# Patient Record
Sex: FEMALE | Race: WHITE | Employment: FULL TIME | URBAN - METROPOLITAN AREA
[De-identification: names, ages, dates, MRNs, and addresses within clinical notes are randomized per-mention and may not be internally consistent; named-entity substitution may affect disease eponyms.]

---

## 2017-07-19 ENCOUNTER — OFFICE VISIT (OUTPATIENT)
Dept: INTERNAL MEDICINE CLINIC | Facility: CLINIC | Age: 47
End: 2017-07-19

## 2017-07-19 VITALS
SYSTOLIC BLOOD PRESSURE: 102 MMHG | WEIGHT: 158 LBS | BODY MASS INDEX: 23.95 KG/M2 | OXYGEN SATURATION: 98 % | HEIGHT: 68 IN | DIASTOLIC BLOOD PRESSURE: 60 MMHG | TEMPERATURE: 98 F | HEART RATE: 72 BPM

## 2017-07-19 DIAGNOSIS — M79.644 BILATERAL THUMB PAIN: Primary | ICD-10-CM

## 2017-07-19 DIAGNOSIS — R10.12 LUQ ABDOMINAL PAIN: ICD-10-CM

## 2017-07-19 DIAGNOSIS — M79.645 BILATERAL THUMB PAIN: Primary | ICD-10-CM

## 2017-07-19 PROCEDURE — 99214 OFFICE O/P EST MOD 30 MIN: CPT | Performed by: INTERNAL MEDICINE

## 2017-07-19 PROCEDURE — 99212 OFFICE O/P EST SF 10 MIN: CPT | Performed by: INTERNAL MEDICINE

## 2017-07-19 RX ORDER — PANTOPRAZOLE SODIUM 40 MG/1
40 TABLET, DELAYED RELEASE ORAL
Qty: 90 TABLET | Refills: 3 | Status: SHIPPED | OUTPATIENT
Start: 2017-07-19 | End: 2018-12-12

## 2017-07-19 RX ORDER — OMEGA-3 FATTY ACIDS/FISH OIL 300-1000MG
1 CAPSULE ORAL
COMMUNITY
End: 2017-07-19

## 2017-07-19 NOTE — PROGRESS NOTES
Illene Carrel is a 52year old female who presents for     Thumbs pain:  Thumbs hurt for a few mo. No fall or injury. Has became painful to open jar or water bottle. Feels worse when texts twice a day. Works on computer all day. Tried advil.  Helps stomach    Review of systems:  Constitutional:  No fever, loss of appetite or unintentional weight loss  Gastrointestinal: No vomiting, diarrhea or constipation  : no dysuria or blood in urine. No reg menses on Mirena IUD. Spots every 6 mo.  Will see  Rfl: 0         Ray Brooks MD  7/19/2017

## 2017-07-21 ENCOUNTER — HOSPITAL ENCOUNTER (OUTPATIENT)
Dept: GENERAL RADIOLOGY | Facility: HOSPITAL | Age: 47
Discharge: HOME | End: 2017-07-21
Attending: INTERNAL MEDICINE
Payer: COMMERCIAL

## 2017-07-21 ENCOUNTER — LAB ENCOUNTER (OUTPATIENT)
Dept: LAB | Facility: HOSPITAL | Age: 47
End: 2017-07-21
Attending: INTERNAL MEDICINE
Payer: COMMERCIAL

## 2017-07-21 DIAGNOSIS — M79.644 BILATERAL THUMB PAIN: ICD-10-CM

## 2017-07-21 DIAGNOSIS — M79.645 BILATERAL THUMB PAIN: ICD-10-CM

## 2017-07-21 DIAGNOSIS — R10.12 LUQ ABDOMINAL PAIN: ICD-10-CM

## 2017-07-21 LAB
ALBUMIN SERPL BCP-MCNC: 4 G/DL (ref 3.5–4.8)
ALBUMIN/GLOB SERPL: 1.3 {RATIO} (ref 1–2)
ALP SERPL-CCNC: 49 U/L (ref 32–100)
ALT SERPL-CCNC: 15 U/L (ref 14–54)
ANION GAP SERPL CALC-SCNC: 5 MMOL/L (ref 0–18)
AST SERPL-CCNC: 14 U/L (ref 15–41)
BASOPHILS # BLD: 0 K/UL (ref 0–0.2)
BASOPHILS NFR BLD: 1 %
BILIRUB SERPL-MCNC: 1 MG/DL (ref 0.3–1.2)
BILIRUB UR QL: NEGATIVE
BUN SERPL-MCNC: 19 MG/DL (ref 8–20)
BUN/CREAT SERPL: 23.8 (ref 10–20)
CALCIUM SERPL-MCNC: 9.1 MG/DL (ref 8.5–10.5)
CHLORIDE SERPL-SCNC: 109 MMOL/L (ref 95–110)
CHOLEST SERPL-MCNC: 133 MG/DL (ref 110–200)
CO2 SERPL-SCNC: 27 MMOL/L (ref 22–32)
COLOR UR: YELLOW
CREAT SERPL-MCNC: 0.8 MG/DL (ref 0.5–1.5)
EOSINOPHIL # BLD: 0.1 K/UL (ref 0–0.7)
EOSINOPHIL NFR BLD: 2 %
ERYTHROCYTE [DISTWIDTH] IN BLOOD BY AUTOMATED COUNT: 14.3 % (ref 11–15)
GLOBULIN PLAS-MCNC: 3.1 G/DL (ref 2.5–3.7)
GLUCOSE SERPL-MCNC: 105 MG/DL (ref 70–99)
GLUCOSE UR-MCNC: NEGATIVE MG/DL
HCT VFR BLD AUTO: 40.3 % (ref 35–48)
HDLC SERPL-MCNC: 58 MG/DL
HGB BLD-MCNC: 13.6 G/DL (ref 12–16)
KETONES UR-MCNC: NEGATIVE MG/DL
LDLC SERPL CALC-MCNC: 61 MG/DL (ref 0–99)
LYMPHOCYTES # BLD: 2 K/UL (ref 1–4)
LYMPHOCYTES NFR BLD: 30 %
MCH RBC QN AUTO: 30.5 PG (ref 27–32)
MCHC RBC AUTO-ENTMCNC: 33.9 G/DL (ref 32–37)
MCV RBC AUTO: 90 FL (ref 80–100)
MONOCYTES # BLD: 0.5 K/UL (ref 0–1)
MONOCYTES NFR BLD: 8 %
NEUTROPHILS # BLD AUTO: 3.9 K/UL (ref 1.8–7.7)
NEUTROPHILS NFR BLD: 59 %
NITRITE UR QL STRIP.AUTO: NEGATIVE
NONHDLC SERPL-MCNC: 75 MG/DL
OSMOLALITY UR CALC.SUM OF ELEC: 295 MOSM/KG (ref 275–295)
PH UR: 5 [PH] (ref 5–8)
PLATELET # BLD AUTO: 208 K/UL (ref 140–400)
PMV BLD AUTO: 7.5 FL (ref 7.4–10.3)
POTASSIUM SERPL-SCNC: 4.6 MMOL/L (ref 3.3–5.1)
PROT SERPL-MCNC: 7.1 G/DL (ref 5.9–8.4)
PROT UR-MCNC: NEGATIVE MG/DL
RBC # BLD AUTO: 4.47 M/UL (ref 3.7–5.4)
RBC #/AREA URNS AUTO: 0 /HPF
SODIUM SERPL-SCNC: 141 MMOL/L (ref 136–144)
SP GR UR STRIP: 1.02 (ref 1–1.03)
TRIGL SERPL-MCNC: 71 MG/DL (ref 1–149)
TSH SERPL-ACNC: 3.98 UIU/ML (ref 0.45–5.33)
UROBILINOGEN UR STRIP-ACNC: <2
VIT C UR-MCNC: NEGATIVE MG/DL
WBC # BLD AUTO: 6.5 K/UL (ref 4–11)
WBC #/AREA URNS AUTO: 3 /HPF

## 2017-07-21 PROCEDURE — 73140 X-RAY EXAM OF FINGER(S): CPT | Performed by: INTERNAL MEDICINE

## 2017-07-21 PROCEDURE — 80053 COMPREHEN METABOLIC PANEL: CPT

## 2017-07-21 PROCEDURE — 84443 ASSAY THYROID STIM HORMONE: CPT

## 2017-07-21 PROCEDURE — 81001 URINALYSIS AUTO W/SCOPE: CPT | Performed by: INTERNAL MEDICINE

## 2017-07-21 PROCEDURE — 85025 COMPLETE CBC W/AUTO DIFF WBC: CPT

## 2017-07-21 PROCEDURE — 36415 COLL VENOUS BLD VENIPUNCTURE: CPT

## 2017-07-21 PROCEDURE — 80061 LIPID PANEL: CPT

## 2017-07-23 ENCOUNTER — TELEPHONE (OUTPATIENT)
Dept: INTERNAL MEDICINE CLINIC | Facility: CLINIC | Age: 47
End: 2017-07-23

## 2017-07-23 NOTE — TELEPHONE ENCOUNTER
To nursing. Please tell pt lab done on 7/21/17-cbc cmp tsh lipid ua-results are ok. Xray of both thumbs-results are negative. No arthritis seen. Therefore her thumb pain is likely in the soft tissues such as a tendonitis.    My recommendation would be t

## 2017-07-24 NOTE — TELEPHONE ENCOUNTER
Called patient and relayed DR. CATALAN message - verbalized understanding. Number for DR. Villarreal given . Patient still has some pain but is only on pantoprazole for a couple days .  If pain gets worse she will schedule roberto sooner then 8/11

## 2017-08-03 ENCOUNTER — OFFICE VISIT (OUTPATIENT)
Dept: SURGERY | Facility: CLINIC | Age: 47
End: 2017-08-03

## 2017-08-03 DIAGNOSIS — M79.641 PAIN IN RIGHT HAND: Primary | ICD-10-CM

## 2017-08-03 DIAGNOSIS — M65.841 OTHER SYNOVITIS AND TENOSYNOVITIS, RIGHT HAND: ICD-10-CM

## 2017-08-03 DIAGNOSIS — M79.642 PAIN OF LEFT HAND: ICD-10-CM

## 2017-08-03 DIAGNOSIS — M65.842 OTHER SYNOVITIS AND TENOSYNOVITIS, LEFT HAND: ICD-10-CM

## 2017-08-03 PROCEDURE — 99243 OFF/OP CNSLTJ NEW/EST LOW 30: CPT | Performed by: PLASTIC SURGERY

## 2017-08-03 PROCEDURE — 99212 OFFICE O/P EST SF 10 MIN: CPT | Performed by: PLASTIC SURGERY

## 2017-08-03 RX ORDER — INDOMETHACIN 25 MG/1
25 CAPSULE ORAL
Qty: 63 CAPSULE | Refills: 0 | Status: SHIPPED | OUTPATIENT
Start: 2017-08-03 | End: 2017-08-11

## 2017-08-03 NOTE — H&P
France Mccabe is a 52year old female that presents with Patient presents with:  Pain: bilateral thumb  .     REFERRED BY:  Beryle Primas      Pacemaker: No  Latex Allergy: no  Coumadin: No  Plavix: No  Other anticoagulants: No  Cardiac stents: No stomach    Past Medical History:   Diagnosis Date   • Anxiety    • Asthma    • Cyst near tailbone     removed   • Meningitis due to viruses 2001   • Microscopic hematuria 2015    CT urogram 12/24/15 negative on conclusion (8 mm cyst R kidney) to follow-up Appropriate mood and affect    Hand Physical Exam:      ROM: bilateral full painless   Wrist Hyperextension: bilateral excellent   Thenar Intrinsics: bilateral intact/symmetric   Ulnar Intrinsics: bilateral intact/symmetric          THUMB CMC:  bilateral

## 2017-08-11 ENCOUNTER — OFFICE VISIT (OUTPATIENT)
Dept: INTERNAL MEDICINE CLINIC | Facility: CLINIC | Age: 47
End: 2017-08-11

## 2017-08-11 VITALS
TEMPERATURE: 99 F | DIASTOLIC BLOOD PRESSURE: 80 MMHG | SYSTOLIC BLOOD PRESSURE: 114 MMHG | WEIGHT: 161.81 LBS | BODY MASS INDEX: 24.52 KG/M2 | OXYGEN SATURATION: 98 % | HEART RATE: 71 BPM | RESPIRATION RATE: 18 BRPM | HEIGHT: 68 IN

## 2017-08-11 DIAGNOSIS — M94.0 COSTOCHONDRITIS: ICD-10-CM

## 2017-08-11 DIAGNOSIS — M65.9 TENOSYNOVITIS OF THUMB: Primary | ICD-10-CM

## 2017-08-11 PROCEDURE — 99212 OFFICE O/P EST SF 10 MIN: CPT | Performed by: INTERNAL MEDICINE

## 2017-08-11 PROCEDURE — 99213 OFFICE O/P EST LOW 20 MIN: CPT | Performed by: INTERNAL MEDICINE

## 2017-08-11 RX ORDER — MELOXICAM 7.5 MG/1
7.5 TABLET ORAL
Qty: 30 TABLET | Refills: 1 | Status: SHIPPED | OUTPATIENT
Start: 2017-08-11 | End: 2017-12-28

## 2017-08-11 NOTE — PROGRESS NOTES
gAustina Sweeney is a 52year old female who presents for     3 wk check    Thumbs pain f/u:  Is better-not as intense as it was. Not as painful to open jars. R thumb pan is gone.  L thumb is still a little painful at mcp and radial wrist.   Saw Dr Kashif Hammond Location: Right arm, Patient Position: Sitting, Cuff Size: adult)   Pulse 71   Temp 98.9 °F (37.2 °C) (Oral)   Resp 18   Ht 5' 8\" (1.727 m)   Wt 161 lb 12.8 oz (73.4 kg)   SpO2 98%   BMI 24.60 kg/m²       Wt Readings from Last 6 Encounters:  08/11/17 : 16 Intrauterine route once.  Disp: 1 each Rfl: 0         Maddie Diaz MD  8/11/2017

## 2017-08-24 ENCOUNTER — OFFICE VISIT (OUTPATIENT)
Dept: SURGERY | Facility: CLINIC | Age: 47
End: 2017-08-24

## 2017-08-24 DIAGNOSIS — M65.842 OTHER SYNOVITIS AND TENOSYNOVITIS, LEFT HAND: Primary | ICD-10-CM

## 2017-08-24 DIAGNOSIS — M65.841 OTHER SYNOVITIS AND TENOSYNOVITIS, RIGHT HAND: ICD-10-CM

## 2017-08-24 PROCEDURE — 99212 OFFICE O/P EST SF 10 MIN: CPT | Performed by: PLASTIC SURGERY

## 2017-08-24 PROCEDURE — 99213 OFFICE O/P EST LOW 20 MIN: CPT | Performed by: PLASTIC SURGERY

## 2017-08-24 NOTE — PROGRESS NOTES
Pt was seen here 3 weeks ago. Wearing Neoprene splints manpreet 8-10 hrs/day and all night  Started taking Indocin. After 1.5 weeks felt she was drowsy, and eating constantly; gaining wt.   Dr. Lalo Chun changed Indocin to Meloxicam.  Pt states stopped taking

## 2017-09-22 ENCOUNTER — OFFICE VISIT (OUTPATIENT)
Dept: INTERNAL MEDICINE CLINIC | Facility: CLINIC | Age: 47
End: 2017-09-22

## 2017-09-22 VITALS
SYSTOLIC BLOOD PRESSURE: 112 MMHG | BODY MASS INDEX: 24.4 KG/M2 | HEART RATE: 60 BPM | WEIGHT: 161 LBS | TEMPERATURE: 99 F | HEIGHT: 68 IN | DIASTOLIC BLOOD PRESSURE: 70 MMHG

## 2017-09-22 DIAGNOSIS — J06.9 URI, ACUTE: Primary | ICD-10-CM

## 2017-09-22 DIAGNOSIS — M94.0 COSTOCHONDRITIS: ICD-10-CM

## 2017-09-22 PROCEDURE — 99213 OFFICE O/P EST LOW 20 MIN: CPT | Performed by: INTERNAL MEDICINE

## 2017-09-22 PROCEDURE — 99212 OFFICE O/P EST SF 10 MIN: CPT | Performed by: INTERNAL MEDICINE

## 2017-09-22 RX ORDER — ESTROGEN,CON/M-PROGEST ACET 0.3-1.5MG
TABLET ORAL
Refills: 1 | COMMUNITY
Start: 2017-09-12 | End: 2018-12-12 | Stop reason: ALTCHOICE

## 2017-09-22 RX ORDER — AZITHROMYCIN 250 MG/1
TABLET, FILM COATED ORAL
Qty: 1 PACKAGE | Refills: 0 | Status: SHIPPED | OUTPATIENT
Start: 2017-09-22 | End: 2017-12-28

## 2017-09-22 NOTE — PROGRESS NOTES
Elsa Hankins is a 52year old female who presents for     5 wk check     Thumbs pain is much better. Taking meloxicam 7.5 mg prn.     LUQ pain-L rib margin pain resolved. Didn't do the CXR.     Saw Dr Lety Acuna and he started prempro for menopausal s 24.48 kg/m²       Wt Readings from Last 6 Encounters:  09/22/17 : 161 lb (73 kg)  08/11/17 : 161 lb 12.8 oz (73.4 kg)  07/19/17 : 158 lb (71.7 kg)  07/19/16 : 160 lb 12.8 oz (72.9 kg)  06/21/16 : 154 lb (69.9 kg)  03/25/16 : 164 lb (74.4 kg)      General: mouth every morning before breakfast. Disp: 90 tablet Rfl: 3   LORazepam 0.5 MG Oral Tab Take 0.5 mg by mouth 2 (two) times daily as needed for Anxiety. Disp:  Rfl:    Levonorgestrel (MIRENA) 20 MCG/24HR Intrauterine IUD 20 mcg by Intrauterine route once.

## 2017-11-04 ENCOUNTER — HOSPITAL ENCOUNTER (OUTPATIENT)
Dept: MAMMOGRAPHY | Facility: HOSPITAL | Age: 47
Discharge: HOME | End: 2017-11-04
Attending: OBSTETRICS & GYNECOLOGY
Payer: COMMERCIAL

## 2017-11-04 DIAGNOSIS — Z12.31 VISIT FOR SCREENING MAMMOGRAM: ICD-10-CM

## 2017-11-04 PROCEDURE — 77067 SCR MAMMO BI INCL CAD: CPT | Performed by: OBSTETRICS & GYNECOLOGY

## 2017-12-28 ENCOUNTER — OFFICE VISIT (OUTPATIENT)
Dept: INTERNAL MEDICINE CLINIC | Facility: CLINIC | Age: 47
End: 2017-12-28

## 2017-12-28 ENCOUNTER — TELEPHONE (OUTPATIENT)
Dept: INTERNAL MEDICINE CLINIC | Facility: CLINIC | Age: 47
End: 2017-12-28

## 2017-12-28 VITALS
TEMPERATURE: 98 F | HEIGHT: 67 IN | BODY MASS INDEX: 27.15 KG/M2 | HEART RATE: 95 BPM | SYSTOLIC BLOOD PRESSURE: 110 MMHG | DIASTOLIC BLOOD PRESSURE: 78 MMHG | OXYGEN SATURATION: 98 % | WEIGHT: 173 LBS

## 2017-12-28 DIAGNOSIS — B34.9 VIRAL SYNDROME: Primary | ICD-10-CM

## 2017-12-28 DIAGNOSIS — J02.9 SORE THROAT: ICD-10-CM

## 2017-12-28 DIAGNOSIS — R53.83 FATIGUE, UNSPECIFIED TYPE: ICD-10-CM

## 2017-12-28 PROCEDURE — 99213 OFFICE O/P EST LOW 20 MIN: CPT | Performed by: INTERNAL MEDICINE

## 2017-12-28 PROCEDURE — 99212 OFFICE O/P EST SF 10 MIN: CPT | Performed by: INTERNAL MEDICINE

## 2017-12-28 NOTE — PROGRESS NOTES
Thomas Kiser is a 52year old female. Patient presents with:   Follow - Up: Complaints of low grade fever, fatigue, vomitting, coughing, nasal congestion, headache - since last night       HPI:   Thomas Kiser is a 52year old female who presents fo removed (L) 1995-96  No date: OTHER SURGICAL HISTORY      Comment: cyst removed from lower back   Family History   Problem Relation Age of Onset   • Heart Disorder Father    • Lipids Father    • Hypertension Father    • Heart Attack Father 50   • Heart Dis

## 2017-12-30 ENCOUNTER — TELEPHONE (OUTPATIENT)
Dept: INTERNAL MEDICINE CLINIC | Facility: CLINIC | Age: 47
End: 2017-12-30

## 2017-12-30 RX ORDER — AZITHROMYCIN 250 MG/1
TABLET, FILM COATED ORAL
Qty: 6 TABLET | Refills: 0 | COMMUNITY
Start: 2017-12-30 | End: 2018-07-13 | Stop reason: ALTCHOICE

## 2017-12-30 RX ORDER — PROMETHAZINE HYDROCHLORIDE AND CODEINE PHOSPHATE 6.25; 1 MG/5ML; MG/5ML
2.5 SYRUP ORAL
Qty: 180 ML | Refills: 0 | COMMUNITY
Start: 2017-12-30 | End: 2018-07-13 | Stop reason: ALTCHOICE

## 2017-12-30 NOTE — TELEPHONE ENCOUNTER
Patient called. She still has a cough. Worse than when she was in the office a few days ago. Head congestion. Sore throat. Feverish. Discussed options. Influenza panel was negative. She states rapid strep was negative.   Will go ahead and give Prachi Caruso

## 2018-07-13 ENCOUNTER — OFFICE VISIT (OUTPATIENT)
Dept: INTERNAL MEDICINE CLINIC | Facility: CLINIC | Age: 48
End: 2018-07-13

## 2018-07-13 VITALS
SYSTOLIC BLOOD PRESSURE: 102 MMHG | BODY MASS INDEX: 26.21 KG/M2 | WEIGHT: 167 LBS | HEIGHT: 67 IN | OXYGEN SATURATION: 99 % | TEMPERATURE: 99 F | HEART RATE: 84 BPM | DIASTOLIC BLOOD PRESSURE: 68 MMHG
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
## 2018-07-13 PROCEDURE — 99212 OFFICE O/P EST SF 10 MIN: CPT | Performed by: INTERNAL MEDICINE

## 2018-07-13 PROCEDURE — 99213 OFFICE O/P EST LOW 20 MIN: CPT | Performed by: INTERNAL MEDICINE

## 2018-07-13 RX ORDER — DOXYCYCLINE HYCLATE 100 MG/1
100 CAPSULE ORAL 2 TIMES DAILY
Qty: 42 CAPSULE | Refills: 0 | Status: SHIPPED | OUTPATIENT
Start: 2018-07-13 | End: 2018-08-03

## 2018-07-13 NOTE — PROGRESS NOTES
Saul Mejias is a 50year old female. Patient presents with:  Bite: red, swollen bump       HPI:   Saul Mejias is a 50year old female who presents for: bite    Was in [place] visit in-laws last weekend.  Came home Monday and noticed area of redn Hypertension Father    • Heart Attack Father 50   • Heart Disorder Mother      irregular heart beat   • 2 brothers, 1 sister Aneesh Albrecht Other    • Breast Cancer Paternal Aunt      late 52's      Social History:   Smoking status: Never Smoker

## 2018-09-27 ENCOUNTER — HOSPITAL ENCOUNTER (OUTPATIENT)
Dept: MAMMOGRAPHY | Facility: HOSPITAL | Age: 48
Discharge: HOME | End: 2018-09-27
Attending: OBSTETRICS & GYNECOLOGY
Payer: COMMERCIAL

## 2018-09-27 ENCOUNTER — HOSPITAL ENCOUNTER (OUTPATIENT)
Dept: ULTRASOUND IMAGING | Facility: HOSPITAL | Age: 48
Discharge: HOME | End: 2018-09-27
Attending: OBSTETRICS & GYNECOLOGY
Payer: COMMERCIAL

## 2018-09-27 DIAGNOSIS — R59.0 LOCALIZED ENLARGED LYMPH NODES: ICD-10-CM

## 2018-09-27 PROCEDURE — 77066 DX MAMMO INCL CAD BI: CPT | Performed by: OBSTETRICS & GYNECOLOGY

## 2018-09-27 PROCEDURE — 77062 BREAST TOMOSYNTHESIS BI: CPT | Performed by: OBSTETRICS & GYNECOLOGY

## 2018-09-27 PROCEDURE — 76642 ULTRASOUND BREAST LIMITED: CPT | Performed by: OBSTETRICS & GYNECOLOGY

## 2018-10-19 ENCOUNTER — ANESTHESIA EVENT (OUTPATIENT)
Dept: SURGERY | Facility: HOSPITAL | Age: 48
End: 2018-10-19
Payer: COMMERCIAL

## 2018-10-19 ENCOUNTER — HOSPITAL ENCOUNTER (OUTPATIENT)
Facility: HOSPITAL | Age: 48
Setting detail: HOSPITAL OUTPATIENT SURGERY
Discharge: HOME | End: 2018-10-19
Attending: OBSTETRICS & GYNECOLOGY | Admitting: OBSTETRICS & GYNECOLOGY
Payer: COMMERCIAL

## 2018-10-19 ENCOUNTER — ANESTHESIA (OUTPATIENT)
Dept: SURGERY | Facility: HOSPITAL | Age: 48
End: 2018-10-19
Payer: COMMERCIAL

## 2018-10-19 VITALS
BODY MASS INDEX: 27.15 KG/M2 | OXYGEN SATURATION: 99 % | SYSTOLIC BLOOD PRESSURE: 109 MMHG | RESPIRATION RATE: 15 BRPM | TEMPERATURE: 98 F | HEIGHT: 67 IN | WEIGHT: 173 LBS | HEART RATE: 57 BPM | DIASTOLIC BLOOD PRESSURE: 69 MMHG

## 2018-10-19 PROBLEM — Z30.432 ENCOUNTER FOR IUD REMOVAL: Status: ACTIVE | Noted: 2018-10-19

## 2018-10-19 PROCEDURE — 88300 SURGICAL PATH GROSS: CPT | Performed by: OBSTETRICS & GYNECOLOGY

## 2018-10-19 PROCEDURE — 81025 URINE PREGNANCY TEST: CPT

## 2018-10-19 PROCEDURE — 0UC98ZZ EXTIRPATION OF MATTER FROM UTERUS, VIA NATURAL OR ARTIFICIAL OPENING ENDOSCOPIC: ICD-10-PCS | Performed by: OBSTETRICS & GYNECOLOGY

## 2018-10-19 RX ORDER — ONDANSETRON 4 MG/1
4 TABLET, FILM COATED ORAL EVERY 8 HOURS PRN
Status: DISCONTINUED | OUTPATIENT
Start: 2018-10-19 | End: 2018-10-19

## 2018-10-19 RX ORDER — IBUPROFEN 600 MG/1
600 TABLET ORAL EVERY 4 HOURS PRN
Status: DISCONTINUED | OUTPATIENT
Start: 2018-10-19 | End: 2018-10-19

## 2018-10-19 RX ORDER — ONDANSETRON 2 MG/ML
4 INJECTION INTRAMUSCULAR; INTRAVENOUS ONCE AS NEEDED
Status: DISCONTINUED | OUTPATIENT
Start: 2018-10-19 | End: 2018-10-19

## 2018-10-19 RX ORDER — ONDANSETRON 2 MG/ML
4 INJECTION INTRAMUSCULAR; INTRAVENOUS EVERY 8 HOURS PRN
Status: DISCONTINUED | OUTPATIENT
Start: 2018-10-19 | End: 2018-10-19

## 2018-10-19 RX ORDER — MORPHINE SULFATE 10 MG/ML
6 INJECTION, SOLUTION INTRAMUSCULAR; INTRAVENOUS EVERY 10 MIN PRN
Status: DISCONTINUED | OUTPATIENT
Start: 2018-10-19 | End: 2018-10-19

## 2018-10-19 RX ORDER — HYDROCODONE BITARTRATE AND ACETAMINOPHEN 5; 325 MG/1; MG/1
1 TABLET ORAL AS NEEDED
Status: DISCONTINUED | OUTPATIENT
Start: 2018-10-19 | End: 2018-10-19

## 2018-10-19 RX ORDER — ACETAMINOPHEN 500 MG
1000 TABLET ORAL ONCE
Status: COMPLETED | OUTPATIENT
Start: 2018-10-19 | End: 2018-10-19

## 2018-10-19 RX ORDER — SODIUM CHLORIDE, SODIUM LACTATE, POTASSIUM CHLORIDE, CALCIUM CHLORIDE 600; 310; 30; 20 MG/100ML; MG/100ML; MG/100ML; MG/100ML
INJECTION, SOLUTION INTRAVENOUS CONTINUOUS
Status: DISCONTINUED | OUTPATIENT
Start: 2018-10-19 | End: 2018-10-19

## 2018-10-19 RX ORDER — ONDANSETRON 2 MG/ML
INJECTION INTRAMUSCULAR; INTRAVENOUS AS NEEDED
Status: DISCONTINUED | OUTPATIENT
Start: 2018-10-19 | End: 2018-10-19 | Stop reason: SURG

## 2018-10-19 RX ORDER — IBUPROFEN 200 MG
400 TABLET ORAL EVERY 4 HOURS PRN
Status: DISCONTINUED | OUTPATIENT
Start: 2018-10-19 | End: 2018-10-19

## 2018-10-19 RX ORDER — DEXAMETHASONE SODIUM PHOSPHATE 4 MG/ML
VIAL (ML) INJECTION AS NEEDED
Status: DISCONTINUED | OUTPATIENT
Start: 2018-10-19 | End: 2018-10-19 | Stop reason: SURG

## 2018-10-19 RX ORDER — MIDAZOLAM HYDROCHLORIDE 1 MG/ML
INJECTION INTRAMUSCULAR; INTRAVENOUS AS NEEDED
Status: DISCONTINUED | OUTPATIENT
Start: 2018-10-19 | End: 2018-10-19 | Stop reason: SURG

## 2018-10-19 RX ORDER — METOCLOPRAMIDE 10 MG/1
10 TABLET ORAL ONCE
Status: DISCONTINUED | OUTPATIENT
Start: 2018-10-19 | End: 2018-10-19 | Stop reason: HOSPADM

## 2018-10-19 RX ORDER — IBUPROFEN 200 MG
200 TABLET ORAL EVERY 4 HOURS PRN
Status: DISCONTINUED | OUTPATIENT
Start: 2018-10-19 | End: 2018-10-19

## 2018-10-19 RX ORDER — HALOPERIDOL 5 MG/ML
0.25 INJECTION INTRAMUSCULAR ONCE AS NEEDED
Status: DISCONTINUED | OUTPATIENT
Start: 2018-10-19 | End: 2018-10-19

## 2018-10-19 RX ORDER — NALOXONE HYDROCHLORIDE 0.4 MG/ML
80 INJECTION, SOLUTION INTRAMUSCULAR; INTRAVENOUS; SUBCUTANEOUS AS NEEDED
Status: DISCONTINUED | OUTPATIENT
Start: 2018-10-19 | End: 2018-10-19

## 2018-10-19 RX ORDER — MORPHINE SULFATE 4 MG/ML
2 INJECTION, SOLUTION INTRAMUSCULAR; INTRAVENOUS EVERY 10 MIN PRN
Status: DISCONTINUED | OUTPATIENT
Start: 2018-10-19 | End: 2018-10-19

## 2018-10-19 RX ORDER — HYDROCODONE BITARTRATE AND ACETAMINOPHEN 5; 325 MG/1; MG/1
2 TABLET ORAL AS NEEDED
Status: DISCONTINUED | OUTPATIENT
Start: 2018-10-19 | End: 2018-10-19

## 2018-10-19 RX ORDER — FAMOTIDINE 20 MG/1
20 TABLET ORAL ONCE
Status: DISCONTINUED | OUTPATIENT
Start: 2018-10-19 | End: 2018-10-19 | Stop reason: HOSPADM

## 2018-10-19 RX ORDER — MORPHINE SULFATE 4 MG/ML
4 INJECTION, SOLUTION INTRAMUSCULAR; INTRAVENOUS EVERY 10 MIN PRN
Status: DISCONTINUED | OUTPATIENT
Start: 2018-10-19 | End: 2018-10-19

## 2018-10-19 RX ADMIN — MIDAZOLAM HYDROCHLORIDE 2 MG: 1 INJECTION INTRAMUSCULAR; INTRAVENOUS at 10:35:00

## 2018-10-19 RX ADMIN — DEXAMETHASONE SODIUM PHOSPHATE 4 MG: 4 MG/ML VIAL (ML) INJECTION at 10:55:00

## 2018-10-19 RX ADMIN — ONDANSETRON 4 MG: 2 INJECTION INTRAMUSCULAR; INTRAVENOUS at 10:35:00

## 2018-10-19 RX ADMIN — SODIUM CHLORIDE, SODIUM LACTATE, POTASSIUM CHLORIDE, CALCIUM CHLORIDE: 600; 310; 30; 20 INJECTION, SOLUTION INTRAVENOUS at 10:35:00

## 2018-10-19 NOTE — BRIEF OP NOTE
Pre-Operative Diagnosis: Retained IUD     Post-Operative Diagnosis: Retained IUD      Procedure Performed:   Procedure(s):  Operative hysteroscopy removal of IUD    Surgeon(s) and Role:     * Joaquin Mejias MD - Primary    Assistant(s):        Brenda

## 2018-10-19 NOTE — ANESTHESIA PROCEDURE NOTES
ANESTHESIA INTUBATION  Date/Time: 10/19/2018 10:40 AM  Urgency: elective    Airway not difficult    General Information and Staff    Patient location during procedure: OR  Anesthesiologist: Philip Solomon MD  Performed: anesthesiologist     Indications

## 2018-10-19 NOTE — INTERVAL H&P NOTE
Pre-op Diagnosis: Retained IUD    The above referenced H&P was reviewed by Hector Tovar MD on 10/19/2018, the patient was examined and no significant changes have occurred in the patient's condition since the H&P was performed.   I discussed with

## 2018-10-19 NOTE — OPERATIVE REPORT
[facility]    PATIENT'S NAME: Andrés SANTOYO   ATTENDING PHYSICIAN: Jazmine Aguilera. MD Chelsey   OPERATING PHYSICIAN: Jazmine Aguilera.  MD Chelsey   PATIENT ACCOUNT#:   [id number]    LOCATION:  [facility] PACU 1 [facility] 10  MEDICAL RECORD #:   [id number] 5 mL.  It was a very brief procedure. The patient tolerated the procedure well and was brought to the recovery room in good condition. Dictated By Yemi Barbosa MD  d: 10/19/2018 11:13:20  t: 10/19/2018 11:55:04  [facility] [id number]  WAF/[id number]

## 2018-10-19 NOTE — ANESTHESIA PREPROCEDURE EVALUATION
Anesthesia PreOp Note    HPI:     Shanna Lundy is a 50year old female who presents for preoperative consultation requested by: Vera Waldron MD    Date of Surgery: 10/19/2018    Procedure(s):   HYSTEROSCOPY DIAGNOSTIC  Indication: Retained I time       Current Facility-Administered Medications Ordered in Epic:  lactated ringers infusion  Intravenous Continuous Colin Barbosa MD Last Rate: 20 mL/hr at 10/19/18 0945   famoTIDine (PEPCID) tab 20 mg 20 mg Oral Once Colin Barbosa, Special Diet: Not Asked        Back Care: Not Asked        Exercise: Not Asked        Bike Helmet: Not Asked        Seat Belt: Not Asked        Self-Exams: Not Asked    Social History Narrative      Not on file      Available pre-op labs reviewed.   Lab Res

## 2018-10-19 NOTE — H&P
[facility]    PATIENT'S NAME: Dania SANTOYO   ATTENDING PHYSICIAN: Wagner Pham.  MD Chelsey   PATIENT ACCOUNT#:   [id number]    LOCATION:    MEDICAL RECORD #:   [id number]       YOB: 1970  ADMISSION DATE:       10/19/2018    H pressure 104/66. HEENT:  Pupils equal, round, react to light. Nares and throat clear. NECK:  Supple without thyromegaly or adenopathy. LUNGS:  Clear. HEART:  Regular rate and rhythm without murmurs. BREASTS:  No masses.   ABDOMEN:  No hepatosplenomega

## 2018-10-25 ENCOUNTER — OFFICE VISIT (OUTPATIENT)
Dept: INTERNAL MEDICINE CLINIC | Facility: CLINIC | Age: 48
End: 2018-10-25
Payer: COMMERCIAL

## 2018-10-25 VITALS
SYSTOLIC BLOOD PRESSURE: 124 MMHG | HEART RATE: 92 BPM | DIASTOLIC BLOOD PRESSURE: 80 MMHG | HEIGHT: 67 IN | BODY MASS INDEX: 26.68 KG/M2 | TEMPERATURE: 100 F | OXYGEN SATURATION: 98 % | WEIGHT: 170 LBS

## 2018-10-25 DIAGNOSIS — J40 BRONCHITIS: Primary | ICD-10-CM

## 2018-10-25 PROCEDURE — 99212 OFFICE O/P EST SF 10 MIN: CPT | Performed by: INTERNAL MEDICINE

## 2018-10-25 PROCEDURE — 99213 OFFICE O/P EST LOW 20 MIN: CPT | Performed by: INTERNAL MEDICINE

## 2018-10-25 RX ORDER — AZITHROMYCIN 250 MG/1
TABLET, FILM COATED ORAL
Qty: 6 TABLET | Refills: 0 | Status: SHIPPED | OUTPATIENT
Start: 2018-10-25 | End: 2018-12-12 | Stop reason: ALTCHOICE

## 2018-10-25 RX ORDER — PROMETHAZINE HYDROCHLORIDE AND CODEINE PHOSPHATE 6.25; 1 MG/5ML; MG/5ML
2.5 SYRUP ORAL EVERY 4 HOURS PRN
Qty: 180 ML | Refills: 0 | Status: SHIPPED | OUTPATIENT
Start: 2018-10-25 | End: 2018-12-12 | Stop reason: ALTCHOICE

## 2018-10-25 NOTE — PROGRESS NOTES
Khalif Reyes is a 50year old female. Patient presents with:  URI: Patient reports Monday she developed a sore throat, Tuesday a runny nose and Wednesday a cough. Coughing up yellow/clear mucous. Same color as nasal drainage.  Feels fever broke at 3 am. HYSTEROSCOPY DIAGNOSTIC N/A 10/19/2018    Performed by Peg Diehl MD at [facility] OR   • LASIK     • OTHER  10/19/2018    IUD surgically removed   • OTHER SURGICAL HISTORY      5th digit cyst removed (L) foot 1995-96    • OTHER SURGICAL HISTORY MG/5ML Oral Syrup; Take 2.5 mL by mouth every 4 (four) hours as needed for cough.   Dispense: 180 mL; Refill: 0      Yuly Lopez DO  10/25/2018  1:01 PM

## 2018-12-12 ENCOUNTER — OFFICE VISIT (OUTPATIENT)
Dept: INTERNAL MEDICINE CLINIC | Facility: CLINIC | Age: 48
End: 2018-12-12
Payer: COMMERCIAL

## 2018-12-12 VITALS
SYSTOLIC BLOOD PRESSURE: 112 MMHG | WEIGHT: 168 LBS | OXYGEN SATURATION: 98 % | DIASTOLIC BLOOD PRESSURE: 80 MMHG | HEART RATE: 80 BPM | BODY MASS INDEX: 26.37 KG/M2 | HEIGHT: 67 IN | TEMPERATURE: 99 F

## 2018-12-12 DIAGNOSIS — J06.9 URI, ACUTE: Primary | ICD-10-CM

## 2018-12-12 PROCEDURE — 99213 OFFICE O/P EST LOW 20 MIN: CPT | Performed by: INTERNAL MEDICINE

## 2018-12-12 PROCEDURE — 99212 OFFICE O/P EST SF 10 MIN: CPT | Performed by: INTERNAL MEDICINE

## 2018-12-12 RX ORDER — PANTOPRAZOLE SODIUM 40 MG/1
40 TABLET, DELAYED RELEASE ORAL
Qty: 90 TABLET | Refills: 3 | Status: SHIPPED | OUTPATIENT
Start: 2018-12-12 | End: 2019-11-01

## 2018-12-12 RX ORDER — PROMETHAZINE HYDROCHLORIDE AND CODEINE PHOSPHATE 6.25; 1 MG/5ML; MG/5ML
5 SYRUP ORAL EVERY 6 HOURS PRN
Qty: 180 ML | Refills: 0 | Status: SHIPPED
Start: 2018-12-12 | End: 2019-02-19

## 2018-12-12 RX ORDER — LORAZEPAM 0.5 MG/1
0.5 TABLET ORAL DAILY PRN
Qty: 20 TABLET | Refills: 1 | Status: SHIPPED
Start: 2018-12-12 | End: 2020-04-16

## 2018-12-12 RX ORDER — AZITHROMYCIN 250 MG/1
TABLET, FILM COATED ORAL
Qty: 1 PACKAGE | Refills: 0 | Status: SHIPPED | OUTPATIENT
Start: 2018-12-12 | End: 2019-02-19

## 2018-12-12 NOTE — PROGRESS NOTES
Elfrieda Riedel is a 50year old femalewho presents with     cold symptoms. Onset: 2 days ago  Started with: runny nose and sore throat and now cough. Today coughed up a \"ball\" of phlegm.    Associated symptoms: yellow to clear nasal drainage, post n Yes      Alcohol/week: 1.2 - 1.8 oz      Types: 2 - 3 Glasses of wine per week      Comment: weekly    Drug use: No         Allergies:  No Known Allergies    EXAM:   /80 (BP Location: Left arm, Patient Position: Sitting, Cuff Size: adult)   Pulse 80

## 2019-02-19 ENCOUNTER — OFFICE VISIT (OUTPATIENT)
Dept: INTERNAL MEDICINE CLINIC | Facility: CLINIC | Age: 49
End: 2019-02-19
Payer: COMMERCIAL

## 2019-02-19 VITALS
BODY MASS INDEX: 26 KG/M2 | DIASTOLIC BLOOD PRESSURE: 80 MMHG | SYSTOLIC BLOOD PRESSURE: 118 MMHG | TEMPERATURE: 98 F | OXYGEN SATURATION: 99 % | WEIGHT: 167 LBS | HEART RATE: 72 BPM

## 2019-02-19 DIAGNOSIS — M54.12 CERVICAL RADICULOPATHY: Primary | ICD-10-CM

## 2019-02-19 PROCEDURE — 99212 OFFICE O/P EST SF 10 MIN: CPT | Performed by: INTERNAL MEDICINE

## 2019-02-19 PROCEDURE — 99213 OFFICE O/P EST LOW 20 MIN: CPT | Performed by: INTERNAL MEDICINE

## 2019-02-19 RX ORDER — CYCLOBENZAPRINE HCL 5 MG
5 TABLET ORAL 3 TIMES DAILY PRN
Qty: 20 TABLET | Refills: 1 | Status: SHIPPED | OUTPATIENT
Start: 2019-02-19 | End: 2019-11-01

## 2019-02-19 RX ORDER — METHYLPREDNISOLONE 4 MG/1
TABLET ORAL
Qty: 1 KIT | Refills: 0 | Status: SHIPPED | OUTPATIENT
Start: 2019-02-19 | End: 2019-11-01

## 2019-02-19 NOTE — PROGRESS NOTES
Saul Mejias is a 52year old female who presents for     L neck pain-travels to L arm. Onset 4 wks ago, L posterior neck pain and L trapezius area \"knot\". Went to chiropractor and had adjustments. Felt better initially and then it came back. Female       Social History:   Social History    Tobacco Use      Smoking status: Never Smoker      Smokeless tobacco: Never Used    Alcohol use:  Yes      Alcohol/week: 1.2 - 1.8 oz      Types: 2 - 3 Glasses of wine per week      Comment: weekly    Drug us MD  2/19/2019

## 2019-07-10 ENCOUNTER — HOSPITAL ENCOUNTER (OUTPATIENT)
Dept: MAMMOGRAPHY | Facility: HOSPITAL | Age: 49
Discharge: HOME | End: 2019-07-10
Attending: OBSTETRICS & GYNECOLOGY
Payer: COMMERCIAL

## 2019-07-10 DIAGNOSIS — R92.1 MAMMOGRAPHIC CALCIFICATION FOUND ON DIAGNOSTIC IMAGING OF BREAST: ICD-10-CM

## 2019-07-10 PROCEDURE — 77061 BREAST TOMOSYNTHESIS UNI: CPT | Performed by: OBSTETRICS & GYNECOLOGY

## 2019-07-10 PROCEDURE — 77065 DX MAMMO INCL CAD UNI: CPT | Performed by: OBSTETRICS & GYNECOLOGY

## 2019-11-01 ENCOUNTER — OFFICE VISIT (OUTPATIENT)
Dept: INTERNAL MEDICINE CLINIC | Facility: CLINIC | Age: 49
End: 2019-11-01
Payer: COMMERCIAL

## 2019-11-01 ENCOUNTER — TELEPHONE (OUTPATIENT)
Dept: INTERNAL MEDICINE CLINIC | Facility: CLINIC | Age: 49
End: 2019-11-01

## 2019-11-01 ENCOUNTER — LAB ENCOUNTER (OUTPATIENT)
Dept: LAB | Age: 49
End: 2019-11-01
Attending: INTERNAL MEDICINE
Payer: COMMERCIAL

## 2019-11-01 VITALS
HEIGHT: 67 IN | SYSTOLIC BLOOD PRESSURE: 112 MMHG | HEART RATE: 68 BPM | WEIGHT: 167 LBS | BODY MASS INDEX: 26.21 KG/M2 | DIASTOLIC BLOOD PRESSURE: 74 MMHG | TEMPERATURE: 98 F

## 2019-11-01 DIAGNOSIS — R73.03 PREDIABETES: ICD-10-CM

## 2019-11-01 DIAGNOSIS — Z00.00 ANNUAL PHYSICAL EXAM: Primary | ICD-10-CM

## 2019-11-01 DIAGNOSIS — Z00.00 ANNUAL PHYSICAL EXAM: ICD-10-CM

## 2019-11-01 DIAGNOSIS — R14.0 ABDOMINAL BLOATING: ICD-10-CM

## 2019-11-01 PROCEDURE — 85025 COMPLETE CBC W/AUTO DIFF WBC: CPT

## 2019-11-01 PROCEDURE — 99396 PREV VISIT EST AGE 40-64: CPT | Performed by: INTERNAL MEDICINE

## 2019-11-01 PROCEDURE — 82784 ASSAY IGA/IGD/IGG/IGM EACH: CPT

## 2019-11-01 PROCEDURE — 90715 TDAP VACCINE 7 YRS/> IM: CPT | Performed by: INTERNAL MEDICINE

## 2019-11-01 PROCEDURE — 80061 LIPID PANEL: CPT

## 2019-11-01 PROCEDURE — 81001 URINALYSIS AUTO W/SCOPE: CPT | Performed by: INTERNAL MEDICINE

## 2019-11-01 PROCEDURE — 36415 COLL VENOUS BLD VENIPUNCTURE: CPT

## 2019-11-01 PROCEDURE — 83516 IMMUNOASSAY NONANTIBODY: CPT

## 2019-11-01 PROCEDURE — 84443 ASSAY THYROID STIM HORMONE: CPT

## 2019-11-01 PROCEDURE — 90471 IMMUNIZATION ADMIN: CPT | Performed by: INTERNAL MEDICINE

## 2019-11-01 PROCEDURE — 80053 COMPREHEN METABOLIC PANEL: CPT

## 2019-11-01 RX ORDER — PANTOPRAZOLE SODIUM 40 MG/1
40 TABLET, DELAYED RELEASE ORAL DAILY PRN
Qty: 90 TABLET | Refills: 3 | COMMUNITY
Start: 2019-11-01 | End: 2020-09-30

## 2019-11-01 NOTE — PROGRESS NOTES
Ameya Sarabia is a 52year old female who presents for     This visit done as annual physical    Pre diabetes  \"I had my annual gyn visit and Dr Evy Stone  tested my sugar b/c I had gestational diabetes. The results showed I have pre-diabetes. \"  A1 • Heart Disorder Mother         irregular heart beat   • Other (2 brothers, 1 sister) Other    • Breast Cancer Paternal Aunt         late 52's   • Cancer Maternal Cousin Female         mid 45s pancreatic   • Cancer Paternal Cousin Female       Social His diarrhea--has constip. Possibly IBS, rule out celiac. Add benefiber 1 tsp daily. Check TTGA ab. See GI--Dr Alanis Prior. Due for age 48 c'scopy soon anyway. Hx esophagitis:  EGD 2012-Dr Dobson--esophagitis and min gastric erythema.  Takes protonix 40 mg d

## 2019-11-01 NOTE — TELEPHONE ENCOUNTER
To nursing, please tell patient labs done today resulted so far are okay. The celiac antibody test is still pending. Good results for both total cholesterol at 146 and LDL cholesterol 57. Blood sugar is normal at 97. Thanks.     Note to self–  11/1/19

## 2019-11-07 ENCOUNTER — HOSPITAL ENCOUNTER (OUTPATIENT)
Dept: MAMMOGRAPHY | Facility: HOSPITAL | Age: 49
Discharge: HOME | End: 2019-11-07
Attending: OBSTETRICS & GYNECOLOGY
Payer: COMMERCIAL

## 2019-11-07 DIAGNOSIS — R92.1 MAMMOGRAPHIC CALCIFICATION FOUND ON DIAGNOSTIC IMAGING OF BREAST: ICD-10-CM

## 2019-11-07 PROCEDURE — 77066 DX MAMMO INCL CAD BI: CPT | Performed by: OBSTETRICS & GYNECOLOGY

## 2019-11-07 PROCEDURE — 77062 BREAST TOMOSYNTHESIS BI: CPT | Performed by: OBSTETRICS & GYNECOLOGY

## 2019-11-22 NOTE — TELEPHONE ENCOUNTER
To nursing, please tell pt   the celiac antibody test result is negative. (normal). See me 12/4/19 as planned. Thanks. Note to self--tissue transglutamidase IgA Ab is nl.

## 2019-12-04 ENCOUNTER — OFFICE VISIT (OUTPATIENT)
Dept: INTERNAL MEDICINE CLINIC | Facility: CLINIC | Age: 49
End: 2019-12-04
Payer: COMMERCIAL

## 2019-12-04 VITALS
SYSTOLIC BLOOD PRESSURE: 120 MMHG | DIASTOLIC BLOOD PRESSURE: 80 MMHG | HEIGHT: 67 IN | HEART RATE: 74 BPM | OXYGEN SATURATION: 98 % | WEIGHT: 169 LBS | BODY MASS INDEX: 26.53 KG/M2 | TEMPERATURE: 98 F

## 2019-12-04 DIAGNOSIS — K90.41 NON-CELIAC GLUTEN SENSITIVITY: Primary | ICD-10-CM

## 2019-12-04 PROCEDURE — 99212 OFFICE O/P EST SF 10 MIN: CPT | Performed by: INTERNAL MEDICINE

## 2019-12-04 NOTE — PROGRESS NOTES
Rowan Navarro is a 52year old female who presents for     1 mo f/u  (last visit 11/1/19 was annual physical)     Abdominal bloating follow-up  No longer nausea.   Abd bloating is not there if avoids carbs such as bread, bagels, etc. Bowels more regular Smoking status: Never Smoker      Smokeless tobacco: Never Used    Alcohol use:  Yes      Alcohol/week: 2.0 - 3.0 standard drinks      Types: 2 - 3 Glasses of wine per week      Comment: weekly    Drug use: No         Allergies:  No Known Allergies       EX Ace (PREMPRO) 0.45-1.5 MG Oral Tab Pt takes daily prn menopausal sx. rx per Dr. Tru Blackwood. 0   • Pantoprazole Sodium (PROTONIX) 40 MG Oral Tab EC Take 1 tablet (40 mg total) by mouth daily as needed.  90 tablet 3   • LORazepam 0.5 MG Oral Tab Take 1 tabl

## 2020-04-15 ENCOUNTER — TELEPHONE (OUTPATIENT)
Dept: INTERNAL MEDICINE CLINIC | Facility: CLINIC | Age: 50
End: 2020-04-15

## 2020-04-15 RX ORDER — PANTOPRAZOLE SODIUM 40 MG/1
40 TABLET, DELAYED RELEASE ORAL DAILY PRN
Qty: 90 TABLET | Refills: 3 | Status: CANCELLED | OUTPATIENT
Start: 2020-04-15

## 2020-04-16 RX ORDER — LORAZEPAM 0.5 MG/1
0.5 TABLET ORAL DAILY PRN
Qty: 20 TABLET | Refills: 1 | Status: SHIPPED | OUTPATIENT
Start: 2020-04-16

## 2020-09-24 ENCOUNTER — APPOINTMENT (OUTPATIENT)
Dept: PHYSICAL THERAPY | Age: 50
End: 2020-09-24
Attending: CHIROPRACTOR
Payer: COMMERCIAL

## 2020-09-24 ENCOUNTER — ORDER TRANSCRIPTION (OUTPATIENT)
Dept: PHYSICAL THERAPY | Facility: HOSPITAL | Age: 50
End: 2020-09-24

## 2020-09-24 DIAGNOSIS — H81.10 BENIGN PAROXYSMAL POSITIONAL VERTIGO: Primary | ICD-10-CM

## 2020-09-29 ENCOUNTER — PATIENT MESSAGE (OUTPATIENT)
Dept: INTERNAL MEDICINE CLINIC | Facility: CLINIC | Age: 50
End: 2020-09-29

## 2020-09-29 ENCOUNTER — APPOINTMENT (OUTPATIENT)
Dept: PHYSICAL THERAPY | Facility: HOSPITAL | Age: 50
End: 2020-09-29
Attending: CHIROPRACTOR
Payer: COMMERCIAL

## 2020-09-29 ENCOUNTER — TELEPHONE (OUTPATIENT)
Dept: PHYSICAL THERAPY | Facility: HOSPITAL | Age: 50
End: 2020-09-29

## 2020-09-29 DIAGNOSIS — R42 VERTIGO: Primary | ICD-10-CM

## 2020-09-29 NOTE — TELEPHONE ENCOUNTER
From: Cassidy Vogt  To: Maddie Diaz MD  Sent: 9/29/2020 9:04 AM CDT  Subject: Other    Dr. Marquis Sung,    Your office should be getting a call to ask for an order for physical therapy to treat vertigo.  I have been experiencing some symptoms and my

## 2020-09-30 ENCOUNTER — OFFICE VISIT (OUTPATIENT)
Dept: INTERNAL MEDICINE CLINIC | Facility: CLINIC | Age: 50
End: 2020-09-30
Payer: COMMERCIAL

## 2020-09-30 ENCOUNTER — TELEPHONE (OUTPATIENT)
Dept: INTERNAL MEDICINE CLINIC | Facility: CLINIC | Age: 50
End: 2020-09-30

## 2020-09-30 VITALS
RESPIRATION RATE: 14 BRPM | TEMPERATURE: 97 F | DIASTOLIC BLOOD PRESSURE: 80 MMHG | WEIGHT: 169 LBS | HEART RATE: 77 BPM | BODY MASS INDEX: 26 KG/M2 | SYSTOLIC BLOOD PRESSURE: 118 MMHG | OXYGEN SATURATION: 99 %

## 2020-09-30 DIAGNOSIS — R42 VERTIGO: Primary | ICD-10-CM

## 2020-09-30 DIAGNOSIS — Z00.00 ANNUAL PHYSICAL EXAM: Primary | ICD-10-CM

## 2020-09-30 DIAGNOSIS — R73.03 PREDIABETES: ICD-10-CM

## 2020-09-30 DIAGNOSIS — J45.20 MILD INTERMITTENT ASTHMA WITHOUT COMPLICATION: ICD-10-CM

## 2020-09-30 PROCEDURE — 90686 IIV4 VACC NO PRSV 0.5 ML IM: CPT | Performed by: INTERNAL MEDICINE

## 2020-09-30 PROCEDURE — 90471 IMMUNIZATION ADMIN: CPT | Performed by: INTERNAL MEDICINE

## 2020-09-30 PROCEDURE — 99214 OFFICE O/P EST MOD 30 MIN: CPT | Performed by: INTERNAL MEDICINE

## 2020-09-30 PROCEDURE — 3074F SYST BP LT 130 MM HG: CPT | Performed by: INTERNAL MEDICINE

## 2020-09-30 PROCEDURE — 3079F DIAST BP 80-89 MM HG: CPT | Performed by: INTERNAL MEDICINE

## 2020-09-30 RX ORDER — LEVALBUTEROL TARTRATE 45 UG/1
2 AEROSOL, METERED ORAL EVERY 4 HOURS PRN
Qty: 1 INHALER | Refills: 5 | Status: SHIPPED | OUTPATIENT
Start: 2020-09-30 | End: 2021-12-13

## 2020-09-30 RX ORDER — PANTOPRAZOLE SODIUM 40 MG/1
40 TABLET, DELAYED RELEASE ORAL DAILY PRN
Qty: 90 TABLET | Refills: 3 | Status: SHIPPED | OUTPATIENT
Start: 2020-09-30

## 2020-09-30 NOTE — PROGRESS NOTES
David Galdamez is a 48year old female who presents for     Vertigo  A few wks ago, laid down at chiropractor's off and when head back, felt room spinning. When I lay down and my head is lower than the rest of me, my head will spin.   It's the same [facility] • OTHER SURGICAL HISTORY      cyst removed from lower back      Family History   Problem Relation Age of Onset   • Heart Disorder Father    • Lipids Father    • Hypertension Father    • Heart Attack Father 50   • Heart Disorder Mother         irregular h Mild intermittent asthma without complication  Sx of asthma mildly acting up for the last 7 mo--reactivation of asthma that had been quiescent since 2001. Add inhaler--pt notes in the past albuterol inhaler gave her side effects--made her heart race.    R total) by mouth daily as needed for Anxiety. 20 tablet 1   • Conj Estrog-Medroxyprogest Ace (PREMPRO) 0.45-1.5 MG Oral Tab Pt takes daily prn menopausal sx. rx per Dr. Tru Blackwood.   0         Rio Shaikh MD  9/30/20

## 2020-09-30 NOTE — TELEPHONE ENCOUNTER
Pt in office today--lab orders entered under this template for lab before 11/3/20 physical appt--cbc cmp tsh lipid ua A1c   1. Annual physical exam  - CBC WITH DIFFERENTIAL WITH PLATELET; Future  - COMP METABOLIC PANEL (14); Future  - LIPID PANEL;  Future

## 2020-10-01 ENCOUNTER — OFFICE VISIT (OUTPATIENT)
Dept: PHYSICAL THERAPY | Facility: HOSPITAL | Age: 50
End: 2020-10-01
Attending: CHIROPRACTOR
Payer: COMMERCIAL

## 2020-10-01 DIAGNOSIS — R42 VERTIGO: ICD-10-CM

## 2020-10-01 PROCEDURE — 97161 PT EVAL LOW COMPLEX 20 MIN: CPT

## 2020-10-01 PROCEDURE — 95992 CANALITH REPOSITIONING PROC: CPT

## 2020-10-01 NOTE — PROGRESS NOTES
PHYSICAL THERAPY EVALUATION:   Referring Physician: Dr. Gwen Guerrier  Diagnosis: BPPV      Date of Onset: per HPI Date of Service: 10/1/2020        PATIENT SUMMARY   Aspen File is a 48year old y/o female who presents to therapy today with reports o impairment              ASSESSMENT:      Roxanne presents today with recent hx of dizziness triggered by changes in position only-brief in duration. Oculomotor exam is normal. Positional testing reveals findings consistent with L posterior canal BPPV.  CRM p Patient will be seen for 1 x/week or a total of 6 visits over a 90 day period. Treatment will include: Home exercise program development and instruction, Patient/family education, Canalith Repositioning Maneuver.      Education or treatment limitation: Non

## 2020-10-02 ENCOUNTER — APPOINTMENT (OUTPATIENT)
Dept: PHYSICAL THERAPY | Age: 50
End: 2020-10-02
Attending: CHIROPRACTOR
Payer: COMMERCIAL

## 2020-10-06 ENCOUNTER — APPOINTMENT (OUTPATIENT)
Dept: PHYSICAL THERAPY | Facility: HOSPITAL | Age: 50
End: 2020-10-06
Attending: CHIROPRACTOR
Payer: COMMERCIAL

## 2020-10-07 ENCOUNTER — APPOINTMENT (OUTPATIENT)
Dept: PHYSICAL THERAPY | Age: 50
End: 2020-10-07
Attending: CHIROPRACTOR
Payer: COMMERCIAL

## 2020-10-08 ENCOUNTER — OFFICE VISIT (OUTPATIENT)
Dept: PHYSICAL THERAPY | Facility: HOSPITAL | Age: 50
End: 2020-10-08
Attending: CHIROPRACTOR
Payer: COMMERCIAL

## 2020-10-08 PROCEDURE — 97530 THERAPEUTIC ACTIVITIES: CPT

## 2020-10-08 NOTE — PROGRESS NOTES
Discharge Summary    Referring Physician: Amberly Chacon     Diagnosis: BPPV    Pt has attended 2 visits in Physical Therapy. Subjective: Pt denies symptoms since she was last seen. Does note a \"pressure\" in her right ear but no symptoms of vertigo.      A

## 2020-10-09 ENCOUNTER — APPOINTMENT (OUTPATIENT)
Dept: PHYSICAL THERAPY | Age: 50
End: 2020-10-09
Attending: CHIROPRACTOR
Payer: COMMERCIAL

## 2020-10-12 ENCOUNTER — HOSPITAL ENCOUNTER (OUTPATIENT)
Dept: GENERAL RADIOLOGY | Facility: HOSPITAL | Age: 50
Discharge: HOME | End: 2020-10-12
Attending: INTERNAL MEDICINE
Payer: COMMERCIAL

## 2020-10-12 ENCOUNTER — LAB ENCOUNTER (OUTPATIENT)
Dept: LAB | Facility: HOSPITAL | Age: 50
End: 2020-10-12
Attending: INTERNAL MEDICINE
Payer: COMMERCIAL

## 2020-10-12 ENCOUNTER — APPOINTMENT (OUTPATIENT)
Dept: PHYSICAL THERAPY | Facility: HOSPITAL | Age: 50
End: 2020-10-12
Attending: CHIROPRACTOR
Payer: COMMERCIAL

## 2020-10-12 DIAGNOSIS — R73.03 PREDIABETES: Primary | ICD-10-CM

## 2020-10-12 DIAGNOSIS — J45.20 MILD INTERMITTENT ASTHMA WITHOUT COMPLICATION: ICD-10-CM

## 2020-10-12 DIAGNOSIS — Z00.00 ANNUAL PHYSICAL EXAM: ICD-10-CM

## 2020-10-12 PROCEDURE — 84443 ASSAY THYROID STIM HORMONE: CPT

## 2020-10-12 PROCEDURE — 81001 URINALYSIS AUTO W/SCOPE: CPT | Performed by: INTERNAL MEDICINE

## 2020-10-12 PROCEDURE — 80053 COMPREHEN METABOLIC PANEL: CPT

## 2020-10-12 PROCEDURE — 85025 COMPLETE CBC W/AUTO DIFF WBC: CPT

## 2020-10-12 PROCEDURE — 83036 HEMOGLOBIN GLYCOSYLATED A1C: CPT

## 2020-10-12 PROCEDURE — 80061 LIPID PANEL: CPT

## 2020-10-12 PROCEDURE — 36415 COLL VENOUS BLD VENIPUNCTURE: CPT

## 2020-10-12 PROCEDURE — 71046 X-RAY EXAM CHEST 2 VIEWS: CPT | Performed by: INTERNAL MEDICINE

## 2020-10-13 ENCOUNTER — TELEPHONE (OUTPATIENT)
Dept: INTERNAL MEDICINE CLINIC | Facility: CLINIC | Age: 50
End: 2020-10-13

## 2020-10-13 NOTE — TELEPHONE ENCOUNTER
I sent patient 2 results note emails:    Donavon Eli, your chest x-ray done 10/12/20 is normal.  Please see me 11/3/20 as planned and call sooner if needed for any concerns. Regards, Dr aMrquis Sung.     Rip Hammer,  your lab tests done 10/12/20 show overall goo

## 2020-10-14 ENCOUNTER — APPOINTMENT (OUTPATIENT)
Dept: PHYSICAL THERAPY | Age: 50
End: 2020-10-14
Attending: CHIROPRACTOR
Payer: COMMERCIAL

## 2020-10-16 ENCOUNTER — APPOINTMENT (OUTPATIENT)
Dept: PHYSICAL THERAPY | Age: 50
End: 2020-10-16
Attending: CHIROPRACTOR
Payer: COMMERCIAL

## 2020-11-03 ENCOUNTER — OFFICE VISIT (OUTPATIENT)
Dept: INTERNAL MEDICINE CLINIC | Facility: CLINIC | Age: 50
End: 2020-11-03
Payer: COMMERCIAL

## 2020-11-03 VITALS
HEIGHT: 67 IN | BODY MASS INDEX: 26.53 KG/M2 | OXYGEN SATURATION: 100 % | WEIGHT: 169 LBS | TEMPERATURE: 97 F | DIASTOLIC BLOOD PRESSURE: 80 MMHG | SYSTOLIC BLOOD PRESSURE: 120 MMHG | HEART RATE: 74 BPM

## 2020-11-03 DIAGNOSIS — R31.29 MICROSCOPIC HEMATURIA: ICD-10-CM

## 2020-11-03 DIAGNOSIS — J45.20 MILD INTERMITTENT ASTHMA WITHOUT COMPLICATION: ICD-10-CM

## 2020-11-03 DIAGNOSIS — Z00.00 ANNUAL PHYSICAL EXAM: Primary | ICD-10-CM

## 2020-11-03 DIAGNOSIS — R73.03 PREDIABETES: ICD-10-CM

## 2020-11-03 PROCEDURE — 3074F SYST BP LT 130 MM HG: CPT | Performed by: INTERNAL MEDICINE

## 2020-11-03 PROCEDURE — 3079F DIAST BP 80-89 MM HG: CPT | Performed by: INTERNAL MEDICINE

## 2020-11-03 PROCEDURE — 90732 PPSV23 VACC 2 YRS+ SUBQ/IM: CPT | Performed by: INTERNAL MEDICINE

## 2020-11-03 PROCEDURE — 99072 ADDL SUPL MATRL&STAF TM PHE: CPT | Performed by: INTERNAL MEDICINE

## 2020-11-03 PROCEDURE — 99396 PREV VISIT EST AGE 40-64: CPT | Performed by: INTERNAL MEDICINE

## 2020-11-03 PROCEDURE — 3008F BODY MASS INDEX DOCD: CPT | Performed by: INTERNAL MEDICINE

## 2020-11-03 PROCEDURE — 90471 IMMUNIZATION ADMIN: CPT | Performed by: INTERNAL MEDICINE

## 2020-11-03 NOTE — PROGRESS NOTES
Clarissa Okeefe is a 48year old female who presents for     1 mo check and annual physical    Feels good. Vertigo of 9/30/20 visit resolved. Went to vestibular therapist and sx had already resolved.      Asthma--using xopenex inhaler helps tight br Tobacco Use      Smoking status: Never Smoker      Smokeless tobacco: Never Used    Alcohol use:  Yes      Alcohol/week: 2.0 - 3.0 standard drinks      Types: 2 - 3 Glasses of wine per week      Comment: weekly    Drug use: No         Allergies:  No Known A prn.    Hx anxiety--occasionally takes lorazepam.    Healthcare maintenance:   Gyn Dr Barbosa 8/24/17--he rx prempro for menopausal sx--pt is going off prempro. Ro Wilkerson finding. He ord mammo.    Colonoscopy-had telemedice v

## 2020-11-06 ENCOUNTER — HOSPITAL ENCOUNTER (OUTPATIENT)
Age: 50
Discharge: HOME | End: 2020-11-06
Attending: EMERGENCY MEDICINE
Payer: COMMERCIAL

## 2020-11-06 ENCOUNTER — TELEPHONE (OUTPATIENT)
Dept: INTERNAL MEDICINE CLINIC | Facility: CLINIC | Age: 50
End: 2020-11-06

## 2020-11-06 VITALS
RESPIRATION RATE: 18 BRPM | WEIGHT: 169 LBS | SYSTOLIC BLOOD PRESSURE: 114 MMHG | HEIGHT: 67 IN | DIASTOLIC BLOOD PRESSURE: 73 MMHG | TEMPERATURE: 97 F | BODY MASS INDEX: 26.53 KG/M2 | HEART RATE: 70 BPM | OXYGEN SATURATION: 100 %

## 2020-11-06 DIAGNOSIS — Z20.822 ENCOUNTER FOR LABORATORY TESTING FOR COVID-19 VIRUS: Primary | ICD-10-CM

## 2020-11-06 PROCEDURE — 99213 OFFICE O/P EST LOW 20 MIN: CPT | Performed by: EMERGENCY MEDICINE

## 2020-11-06 NOTE — TELEPHONE ENCOUNTER
Pt called  Requesting order for COVID test  Spent time with someone on Monday who tested positive for COVID on Wednesday  Pt has headache, pt has asthma, very concerned  Please call to discuss/advise  Tasked to nursing

## 2020-11-06 NOTE — TELEPHONE ENCOUNTER
To nursing, tell pt if wants a more immediate result for covid, go to [facility] and be eval there and they will order and do the covid test.    I can order covid test but results will take 3-4 days to return.    She needs to self-isolate for 14 days from t

## 2020-11-06 NOTE — TELEPHONE ENCOUNTER
Called patient with Dr. Chandni Penaloza message. Patient did have the covid test done at immediate care. She is okay with waiting for  those results.

## 2020-11-06 NOTE — TELEPHONE ENCOUNTER
Dr. Milton Elliott, patient is calling from immediate care. She was told she cannot have a rapid test and that her test will take 3-5 days for results. Patient is concerned that this will not be faster.  Patient said the nurse was in the room and she will call b

## 2020-11-06 NOTE — ED PROVIDER NOTES
Patient Seen in: [facility]    History   Patient presents with:  Headache    Stated Complaint: covid test headache runny nose    HPI    Patient here with cough, congestion for 2-3 days. No travel, no known sick contacts.   Patient denies sig menopausal sx. rx per Dr. Shari Garcia.        Family History   Problem Relation Age of Onset   • Heart Disorder Father    • Lipids Father    • Hypertension Father    • Heart Attack Father 50   • Heart Disorder Mother         irregular heart beat   • Other ( rashes  Differential to include: URI vs. rhinonsinusitis vs. Bronchitis vs. Pneumonia         ED Course     Labs Reviewed   SARS-COV-2 RNA,QUAL RT-PCR ([facility])       MDM     Radiology:        Disposition and Plan     Clinical Impression:  Encounter for labo

## 2020-11-06 NOTE — TELEPHONE ENCOUNTER
To Tor Carl, Please call pt back with the info for rapid testing testing sites available in [facility], etc that the covid hot line gave you. Tell pt it won't change her mgmt--still needs to isolate for 14 days even if a negative rapid test.   Thanks.

## 2020-11-06 NOTE — TELEPHONE ENCOUNTER
Respiratory infection triage:    Fever:  [x]  No fever  []  Fever>100.4    Cough: no  [] Tight cough  [] Cough with exertion  [] Dry cough  [] Sputum production    Breathing: no  [] Mild shortness of breath interfering with activity  [] Wheezing  [] Pain w

## 2020-11-06 NOTE — TELEPHONE ENCOUNTER
I spoke with patient and relayed Dr. Ramiro Duarte message. She verbalized understanding. She says that she will go over to the immediate care in [place]. I provided her with the contact information. She verbalized understanding. To Dr. Mami Tian.

## 2020-11-19 ENCOUNTER — LAB ENCOUNTER (OUTPATIENT)
Dept: LAB | Age: 50
End: 2020-11-19
Attending: EMERGENCY MEDICINE
Payer: COMMERCIAL

## 2020-11-19 DIAGNOSIS — Z20.822 EXPOSURE TO COVID-19 VIRUS: ICD-10-CM

## 2021-01-14 ENCOUNTER — VIRTUAL PHONE E/M (OUTPATIENT)
Dept: INTERNAL MEDICINE CLINIC | Facility: CLINIC | Age: 51
End: 2021-01-14
Payer: COMMERCIAL

## 2021-01-14 DIAGNOSIS — J06.9 VIRAL UPPER RESPIRATORY TRACT INFECTION: Primary | ICD-10-CM

## 2021-01-14 DIAGNOSIS — J45.20 MILD INTERMITTENT ASTHMA WITHOUT COMPLICATION: ICD-10-CM

## 2021-01-14 DIAGNOSIS — Z11.52 ENCOUNTER FOR SCREENING FOR COVID-19: ICD-10-CM

## 2021-01-14 PROCEDURE — 99214 OFFICE O/P EST MOD 30 MIN: CPT | Performed by: INTERNAL MEDICINE

## 2021-01-14 RX ORDER — AZITHROMYCIN 250 MG/1
TABLET, FILM COATED ORAL
Qty: 6 TABLET | Refills: 0 | Status: SHIPPED | OUTPATIENT
Start: 2021-01-14 | End: 2021-01-19

## 2021-01-14 RX ORDER — PROMETHAZINE HYDROCHLORIDE AND CODEINE PHOSPHATE 6.25; 1 MG/5ML; MG/5ML
2.5 SYRUP ORAL EVERY 4 HOURS PRN
Qty: 1 BOTTLE | Refills: 0 | Status: SHIPPED | OUTPATIENT
Start: 2021-01-14 | End: 2022-01-14 | Stop reason: ALTCHOICE

## 2021-01-14 RX ORDER — FLUTICASONE PROPIONATE 50 MCG
1 SPRAY, SUSPENSION (ML) NASAL DAILY
Qty: 3 BOTTLE | Refills: 3 | Status: SHIPPED | OUTPATIENT
Start: 2021-01-14 | End: 2022-01-14 | Stop reason: ALTCHOICE

## 2021-01-14 RX ORDER — CETIRIZINE HYDROCHLORIDE 10 MG/1
10 TABLET ORAL DAILY
Qty: 30 TABLET | Refills: 1 | Status: SHIPPED | OUTPATIENT
Start: 2021-01-14 | End: 2021-01-20

## 2021-01-14 NOTE — PROGRESS NOTES
This is a telemedicine visit with live, interactive video and audio. Patient understands and accepts financial responsibility for any deductible, co-insurance and/or co-pays associated with this service. SUBJECTIVE  Ms.  Xena Baxter is a 51-year-old fem Performed by Leilani Fallon at [address] [place] [address] OR   • LASIK     • OTHER  10/19/2018    IUD surgically removed   • OTHER SURGICAL HISTORY      5th digit cyst removed (L) foot 1995-96    • OTHER SURGICAL HISTORY      cyst removed from lower back      [facility] per Dr. Bharath Mendoza. 0       OBJECTIVE  Physical Exam:   Patient is aware of the limitations regarding televideo visit. Physical examination was unable to be performed.     ASSESSMENT & PLAN  Diagnoses and all orders for this visit:    Viral upper respira however it is there for her if she is having shortness of breath or audible wheezing.     Discussed red flag symptoms including chest pain, worsening shortness of breath, worsening symptoms overall that she should call the clinic or at least consider urgent

## 2021-01-14 NOTE — PATIENT INSTRUCTIONS
Televideo visit completed today on 1/14/2021. As discussed, it is hard to determine if this is a sinusitis versus upper respiratory infection.   Given severity of symptoms and history of asthma, we will start a course of antibiotics: Azithromycin 500 mg on

## 2021-01-15 ENCOUNTER — LAB ENCOUNTER (OUTPATIENT)
Dept: LAB | Age: 51
End: 2021-01-15
Attending: INTERNAL MEDICINE
Payer: COMMERCIAL

## 2021-01-15 DIAGNOSIS — Z11.52 ENCOUNTER FOR SCREENING FOR COVID-19: ICD-10-CM

## 2021-01-16 LAB — SARS-COV-2 RNA RESP QL NAA+PROBE: NOT DETECTED

## 2021-01-17 ENCOUNTER — TELEPHONE (OUTPATIENT)
Dept: INTERNAL MEDICINE CLINIC | Facility: CLINIC | Age: 51
End: 2021-01-17

## 2021-01-18 NOTE — TELEPHONE ENCOUNTER
Spoke to patient and relayed MD message, patient verbalized understanding. FYI to Dr. Talya Lew-- patient reports she is \"better but not great\", \"not anywhere near 100%\".  She states she is still sleeping 10-12 hours per night and get very tired very ea

## 2021-01-20 ENCOUNTER — APPOINTMENT (OUTPATIENT)
Dept: GENERAL RADIOLOGY | Facility: HOSPITAL | Age: 51
End: 2021-01-20
Attending: EMERGENCY MEDICINE
Payer: COMMERCIAL

## 2021-01-20 ENCOUNTER — HOSPITAL ENCOUNTER (EMERGENCY)
Facility: HOSPITAL | Age: 51
Discharge: HOME | End: 2021-01-20
Attending: EMERGENCY MEDICINE
Payer: COMMERCIAL

## 2021-01-20 ENCOUNTER — VIRTUAL PHONE E/M (OUTPATIENT)
Dept: INTERNAL MEDICINE CLINIC | Facility: CLINIC | Age: 51
End: 2021-01-20
Payer: COMMERCIAL

## 2021-01-20 VITALS
DIASTOLIC BLOOD PRESSURE: 80 MMHG | HEIGHT: 67 IN | SYSTOLIC BLOOD PRESSURE: 109 MMHG | WEIGHT: 161 LBS | RESPIRATION RATE: 18 BRPM | BODY MASS INDEX: 25.27 KG/M2 | HEART RATE: 83 BPM | OXYGEN SATURATION: 100 % | TEMPERATURE: 98 F

## 2021-01-20 VITALS — OXYGEN SATURATION: 97 % | TEMPERATURE: 98 F | HEART RATE: 85 BPM

## 2021-01-20 DIAGNOSIS — J40 BRONCHITIS: Primary | ICD-10-CM

## 2021-01-20 DIAGNOSIS — R07.1 PAINFUL BREATHING: ICD-10-CM

## 2021-01-20 DIAGNOSIS — J06.9 URI, ACUTE: Primary | ICD-10-CM

## 2021-01-20 DIAGNOSIS — Z20.822 EXPOSURE TO COVID-19 VIRUS: ICD-10-CM

## 2021-01-20 DIAGNOSIS — R06.00 DYSPNEA, UNSPECIFIED TYPE: ICD-10-CM

## 2021-01-20 LAB
ANION GAP SERPL CALC-SCNC: 5 MMOL/L (ref 0–18)
BASOPHILS # BLD AUTO: 0.01 X10(3) UL (ref 0–0.2)
BASOPHILS NFR BLD AUTO: 0.2 %
BUN BLD-MCNC: 13 MG/DL (ref 7–18)
BUN/CREAT SERPL: 13.4 (ref 10–20)
CALCIUM BLD-MCNC: 9 MG/DL (ref 8.5–10.1)
CHLORIDE SERPL-SCNC: 106 MMOL/L (ref 98–112)
CO2 SERPL-SCNC: 27 MMOL/L (ref 21–32)
CREAT BLD-MCNC: 0.97 MG/DL
D DIMER PPP FEU-MCNC: <0.27 UG/ML FEU (ref ?–0.5)
DEPRECATED RDW RBC AUTO: 43 FL (ref 35.1–46.3)
EOSINOPHIL # BLD AUTO: 0.02 X10(3) UL (ref 0–0.7)
EOSINOPHIL NFR BLD AUTO: 0.5 %
ERYTHROCYTE [DISTWIDTH] IN BLOOD BY AUTOMATED COUNT: 13 % (ref 11–15)
GLUCOSE BLD-MCNC: 84 MG/DL (ref 70–99)
HCT VFR BLD AUTO: 44.3 %
HGB BLD-MCNC: 14.3 G/DL
IMM GRANULOCYTES # BLD AUTO: 0.02 X10(3) UL (ref 0–1)
IMM GRANULOCYTES NFR BLD: 0.5 %
LYMPHOCYTES # BLD AUTO: 1.07 X10(3) UL (ref 1–4)
LYMPHOCYTES NFR BLD AUTO: 25.7 %
MCH RBC QN AUTO: 29.2 PG (ref 26–34)
MCHC RBC AUTO-ENTMCNC: 32.3 G/DL (ref 31–37)
MCV RBC AUTO: 90.6 FL
MONOCYTES # BLD AUTO: 0.53 X10(3) UL (ref 0.1–1)
MONOCYTES NFR BLD AUTO: 12.7 %
NEUTROPHILS # BLD AUTO: 2.51 X10 (3) UL (ref 1.5–7.7)
NEUTROPHILS # BLD AUTO: 2.51 X10(3) UL (ref 1.5–7.7)
NEUTROPHILS NFR BLD AUTO: 60.4 %
OSMOLALITY SERPL CALC.SUM OF ELEC: 285 MOSM/KG (ref 275–295)
PLATELET # BLD AUTO: 192 10(3)UL (ref 150–450)
POTASSIUM SERPL-SCNC: 4.3 MMOL/L (ref 3.5–5.1)
RBC # BLD AUTO: 4.89 X10(6)UL
SODIUM SERPL-SCNC: 138 MMOL/L (ref 136–145)
TROPONIN I SERPL-MCNC: <0.045 NG/ML (ref ?–0.04)
WBC # BLD AUTO: 4.2 X10(3) UL (ref 4–11)

## 2021-01-20 PROCEDURE — 99284 EMERGENCY DEPT VISIT MOD MDM: CPT

## 2021-01-20 PROCEDURE — 85025 COMPLETE CBC W/AUTO DIFF WBC: CPT | Performed by: EMERGENCY MEDICINE

## 2021-01-20 PROCEDURE — 71045 X-RAY EXAM CHEST 1 VIEW: CPT | Performed by: EMERGENCY MEDICINE

## 2021-01-20 PROCEDURE — 80048 BASIC METABOLIC PNL TOTAL CA: CPT | Performed by: EMERGENCY MEDICINE

## 2021-01-20 PROCEDURE — 85379 FIBRIN DEGRADATION QUANT: CPT | Performed by: EMERGENCY MEDICINE

## 2021-01-20 PROCEDURE — 84484 ASSAY OF TROPONIN QUANT: CPT | Performed by: EMERGENCY MEDICINE

## 2021-01-20 PROCEDURE — 93005 ELECTROCARDIOGRAM TRACING: CPT

## 2021-01-20 PROCEDURE — 36415 COLL VENOUS BLD VENIPUNCTURE: CPT

## 2021-01-20 PROCEDURE — 93010 ELECTROCARDIOGRAM REPORT: CPT | Performed by: EMERGENCY MEDICINE

## 2021-01-20 PROCEDURE — 99213 OFFICE O/P EST LOW 20 MIN: CPT | Performed by: INTERNAL MEDICINE

## 2021-01-20 NOTE — ED INITIAL ASSESSMENT (HPI)
Congestion with intermittent sore throat x10 days. Mild SOB x 7 days. +Fatigue. Denies fevers.    Her  tested COVID+ 1 week ago  Sent by Dr. Rico Mott for [facility]/possibly [facility]

## 2021-01-20 NOTE — ED PROVIDER NOTES
Patient Seen in: [facility] Emergency Department    History   Patient presents with:  Fatigue  Breathing Problem    Stated Complaint: Covid symptoms,  COVID+     HPI    59-year-old female with asthma history of anxiety, asthma present for roberto promethazine-codeine 6.25-10 MG/5ML Oral Syrup,  Take 2.5 mL by mouth every 4 (four) hours as needed for cough.   Patient not taking: Reported on 1/20/2021    Pantoprazole Sodium (PROTONIX) 40 MG Oral Tab EC,  Take 1 tablet (40 mg total) by mouth daily as n Physical Exam   Constitutional: No distress. Nontoxic, well-appearing. HEENT: MMM. Head: Normocephalic. Eyes: No injection. Cardiovascular: RRR. Pulmonary/Chest: Effort normal. CTAB. Abdominal: Soft. Nontender.   Musculoskeletal: No gross d CONCLUSION: Normal examination.      Dictated by (CST): Rob Rodriguez MD on 1/20/2021 at 3:22 PM     Finalized by (CST): Rob Rodriguez MD on 1/20/2021 at 3:22 PM            Heart Score:    HEART Score      Title    Criteria Score   Age: 41-57 Age Scor Medication List

## 2021-01-20 NOTE — TELEPHONE ENCOUNTER
Patient is calling she is still experiencing slight cough, stuffy, no appetite, exhausted, body is weak  She can sleep 12 hours and still tired, something is not right  She has had 2 covid tests both negative    Patient would like to talk to Dr Sherry Reynoso,

## 2021-01-20 NOTE — PROGRESS NOTES
Virtual Telephone Check-In    Aspen File verbally consents to a Virtual/Telephone Check-In visit on 01/20/21. Patient has been referred to the [facility] website at [email or web address] to review the yearly Consent to Treat document.   Patient under Laterality Date   • EGD  03/2012   • HYSTEROSCOPY DIAGNOSTIC N/A 10/19/2018    Performed by Nicole Grey at [facility] OR   • LASIK     • OTHER  10/19/2018    IUD surgically removed   • OTHER SURGICAL HISTORY      5th digit cyst removed (L) foot 19 emergency room for evaluation. I called and gave report to [facility], triage nurse in ER. Patient expresses understanding of above issues and plan. She will go to ER.   Spent 25 min reviewing chart, history, reviewing assessment/ plan, calling ER and comple

## 2021-01-21 LAB — SARS-COV-2 RNA RESP QL NAA+PROBE: DETECTED

## 2021-02-12 ENCOUNTER — HOSPITAL ENCOUNTER (OUTPATIENT)
Dept: MAMMOGRAPHY | Facility: HOSPITAL | Age: 51
Discharge: HOME | End: 2021-02-12
Attending: OBSTETRICS & GYNECOLOGY
Payer: COMMERCIAL

## 2021-02-12 DIAGNOSIS — Z12.31 ENCOUNTER FOR SCREENING MAMMOGRAM FOR MALIGNANT NEOPLASM OF BREAST: ICD-10-CM

## 2021-02-12 PROCEDURE — 77063 BREAST TOMOSYNTHESIS BI: CPT | Performed by: OBSTETRICS & GYNECOLOGY

## 2021-02-12 PROCEDURE — 77067 SCR MAMMO BI INCL CAD: CPT | Performed by: OBSTETRICS & GYNECOLOGY

## 2021-02-13 ENCOUNTER — PATIENT MESSAGE (OUTPATIENT)
Dept: INTERNAL MEDICINE CLINIC | Facility: CLINIC | Age: 51
End: 2021-02-13

## 2021-02-15 ENCOUNTER — PATIENT MESSAGE (OUTPATIENT)
Dept: INTERNAL MEDICINE CLINIC | Facility: CLINIC | Age: 51
End: 2021-02-15

## 2021-02-15 NOTE — TELEPHONE ENCOUNTER
To Dr. Yuridia Nathan to please advise on [employer] message sent by patient today. Pt read MD Posterbee message from 2/15/21: \"Rip Hammer, wait 90 days after positive Covid test to get the Covid vaccine. Please let us know if questions. Regards, Dr Kalpesh Gregg. \"

## 2021-02-15 NOTE — TELEPHONE ENCOUNTER
From: David Galdamez  To: Matilde Aldana MD  Sent: 2/13/2021 3:25 PM CST  Subject: Non-Urgent Medical Question    How soon do I have to wait to get a Covid vaccine shot? I tested positive on January 20th.  Thank you

## 2021-02-15 NOTE — TELEPHONE ENCOUNTER
From: Oscar Leon  To: Ivanna Pickens MD  Sent: 2/15/2021 11:43 AM CST  Subject: Non-Urgent Medical Question    I know it says to wait 90 days but I thought that was if I had treatments.  If I have the opportunity to get it in the next few weeks wou

## 2021-04-09 DIAGNOSIS — Z23 NEED FOR VACCINATION: ICD-10-CM

## 2021-12-13 ENCOUNTER — TELEPHONE (OUTPATIENT)
Dept: INTERNAL MEDICINE CLINIC | Facility: CLINIC | Age: 51
End: 2021-12-13

## 2021-12-14 ENCOUNTER — MED REC SCAN ONLY (OUTPATIENT)
Dept: INTERNAL MEDICINE CLINIC | Facility: CLINIC | Age: 51
End: 2021-12-14

## 2021-12-14 RX ORDER — LEVALBUTEROL TARTRATE 45 UG/1
2 AEROSOL, METERED ORAL EVERY 4 HOURS PRN
Qty: 1 EACH | Refills: 1 | Status: SHIPPED | OUTPATIENT
Start: 2021-12-14

## 2021-12-14 NOTE — TELEPHONE ENCOUNTER
To FD----    Pt due for physical. Please call patient to schedule annual OV. OK to advise short fill given. Thanks!      Refill request is for a maintenance medication and has met the criteria specified in the Ambulatory Medication Refill Standing Order for

## 2021-12-14 NOTE — TELEPHONE ENCOUNTER
Adrienne Merchant requesting PA  Levalbuterol HFA INH 15 gm  ID [id number], [phone]  Placed in purple folder

## 2021-12-14 NOTE — TELEPHONE ENCOUNTER
RN noted pt has failed/adverse reaction to albuterol (alternative)    PA completed and faxed; confirmation received.      To red folder

## 2021-12-17 NOTE — TELEPHONE ENCOUNTER
Charlotte received from [employer] for     Levalbuterol Tartrate 45 mcgact aerosol     Placed in red folder

## 2021-12-21 NOTE — TELEPHONE ENCOUNTER
Note attached to denial ADR to formulary alternative was stated on original PA;   To be reviewed  FD pls fax again

## 2021-12-22 NOTE — TELEPHONE ENCOUNTER
Fax rec'd from [facility]  Addt'l information required for Prior Authorization for:  Levalbuterol  Fax placed in purple folder  Tasked to [employer]

## 2022-01-12 ENCOUNTER — TELEPHONE (OUTPATIENT)
Dept: INTERNAL MEDICINE CLINIC | Facility: CLINIC | Age: 52
End: 2022-01-12

## 2022-01-12 DIAGNOSIS — Z00.00 ANNUAL PHYSICAL EXAM: Primary | ICD-10-CM

## 2022-01-12 DIAGNOSIS — R73.03 PREDIABETES: ICD-10-CM

## 2022-01-12 NOTE — TELEPHONE ENCOUNTER
Patient is calling to request blood work orders to be placed in the system to get done tomorrow morning before her appointment on Friday morning.  Patient was made aware that Dr Jose Gasca is off tomorrow and may not see this message in time for her appointm

## 2022-01-13 NOTE — TELEPHONE ENCOUNTER
Spoke with patient to relay MD message below; Patient will fast and complete tomorrow morning prior to appt.

## 2022-01-13 NOTE — TELEPHONE ENCOUNTER
To nursing, please tell pt lab orders are entered in the system. Thanks. 1. Annual physical exam  - URINALYSIS WITH CULTURE REFLEX  - CBC WITH DIFFERENTIAL WITH PLATELET; Future  - COMP METABOLIC PANEL (14); Future  - LIPID PANEL;  Future  - TSH W REF

## 2022-01-14 ENCOUNTER — LAB ENCOUNTER (OUTPATIENT)
Dept: LAB | Age: 52
End: 2022-01-14
Attending: INTERNAL MEDICINE
Payer: COMMERCIAL

## 2022-01-14 ENCOUNTER — OFFICE VISIT (OUTPATIENT)
Dept: INTERNAL MEDICINE CLINIC | Facility: CLINIC | Age: 52
End: 2022-01-14
Payer: COMMERCIAL

## 2022-01-14 ENCOUNTER — TELEPHONE (OUTPATIENT)
Dept: INTERNAL MEDICINE CLINIC | Facility: CLINIC | Age: 52
End: 2022-01-14

## 2022-01-14 VITALS
OXYGEN SATURATION: 98 % | WEIGHT: 159.19 LBS | BODY MASS INDEX: 24.99 KG/M2 | SYSTOLIC BLOOD PRESSURE: 112 MMHG | HEART RATE: 66 BPM | HEIGHT: 67 IN | DIASTOLIC BLOOD PRESSURE: 68 MMHG | TEMPERATURE: 97 F

## 2022-01-14 DIAGNOSIS — R31.29 MICROSCOPIC HEMATURIA: Primary | ICD-10-CM

## 2022-01-14 DIAGNOSIS — J45.20 MILD INTERMITTENT ASTHMA WITHOUT COMPLICATION: ICD-10-CM

## 2022-01-14 DIAGNOSIS — Z87.898 HISTORY OF VERTIGO: ICD-10-CM

## 2022-01-14 DIAGNOSIS — R73.03 PREDIABETES: ICD-10-CM

## 2022-01-14 DIAGNOSIS — Z00.00 ANNUAL PHYSICAL EXAM: ICD-10-CM

## 2022-01-14 DIAGNOSIS — R31.29 MICROSCOPIC HEMATURIA: ICD-10-CM

## 2022-01-14 DIAGNOSIS — Z00.00 ANNUAL PHYSICAL EXAM: Primary | ICD-10-CM

## 2022-01-14 LAB
ALBUMIN SERPL-MCNC: 4 G/DL (ref 3.4–5)
ALBUMIN/GLOB SERPL: 1.1 {RATIO} (ref 1–2)
ALP LIVER SERPL-CCNC: 78 U/L
ALT SERPL-CCNC: 30 U/L
ANION GAP SERPL CALC-SCNC: 5 MMOL/L (ref 0–18)
AST SERPL-CCNC: 12 U/L (ref 15–37)
BASOPHILS # BLD AUTO: 0.05 X10(3) UL (ref 0–0.2)
BASOPHILS NFR BLD AUTO: 0.7 %
BILIRUB SERPL-MCNC: 0.5 MG/DL (ref 0.1–2)
BILIRUB UR QL: NEGATIVE
BUN BLD-MCNC: 18 MG/DL (ref 7–18)
BUN/CREAT SERPL: 20.9 (ref 10–20)
CALCIUM BLD-MCNC: 9.5 MG/DL (ref 8.5–10.1)
CHLORIDE SERPL-SCNC: 108 MMOL/L (ref 98–112)
CHOLEST SERPL-MCNC: 180 MG/DL (ref ?–200)
CO2 SERPL-SCNC: 28 MMOL/L (ref 21–32)
COLOR UR: YELLOW
CREAT BLD-MCNC: 0.86 MG/DL
DEPRECATED RDW RBC AUTO: 47.4 FL (ref 35.1–46.3)
EOSINOPHIL # BLD AUTO: 0.1 X10(3) UL (ref 0–0.7)
EOSINOPHIL NFR BLD AUTO: 1.5 %
ERYTHROCYTE [DISTWIDTH] IN BLOOD BY AUTOMATED COUNT: 13.8 % (ref 11–15)
EST. AVERAGE GLUCOSE BLD GHB EST-MCNC: 123 MG/DL (ref 68–126)
FASTING PATIENT LIPID ANSWER: YES
FASTING STATUS PATIENT QL REPORTED: YES
GLOBULIN PLAS-MCNC: 3.6 G/DL (ref 2.8–4.4)
GLUCOSE BLD-MCNC: 104 MG/DL (ref 70–99)
GLUCOSE UR-MCNC: NEGATIVE MG/DL
HBA1C MFR BLD: 5.9 % (ref ?–5.7)
HCT VFR BLD AUTO: 43.5 %
HDLC SERPL-MCNC: 76 MG/DL (ref 40–59)
HGB BLD-MCNC: 13.9 G/DL
IMM GRANULOCYTES # BLD AUTO: 0.02 X10(3) UL (ref 0–1)
IMM GRANULOCYTES NFR BLD: 0.3 %
KETONES UR-MCNC: NEGATIVE MG/DL
LDLC SERPL CALC-MCNC: 92 MG/DL (ref ?–100)
LYMPHOCYTES # BLD AUTO: 1.91 X10(3) UL (ref 1–4)
LYMPHOCYTES NFR BLD AUTO: 28.6 %
MCH RBC QN AUTO: 30 PG (ref 26–34)
MCHC RBC AUTO-ENTMCNC: 32 G/DL (ref 31–37)
MCV RBC AUTO: 93.8 FL
MONOCYTES # BLD AUTO: 0.61 X10(3) UL (ref 0.1–1)
MONOCYTES NFR BLD AUTO: 9.1 %
NEUTROPHILS # BLD AUTO: 4 X10 (3) UL (ref 1.5–7.7)
NEUTROPHILS # BLD AUTO: 4 X10(3) UL (ref 1.5–7.7)
NEUTROPHILS NFR BLD AUTO: 59.8 %
NITRITE UR QL STRIP.AUTO: NEGATIVE
NONHDLC SERPL-MCNC: 104 MG/DL (ref ?–130)
OSMOLALITY SERPL CALC.SUM OF ELEC: 294 MOSM/KG (ref 275–295)
PH UR: 5 [PH] (ref 5–8)
PLATELET # BLD AUTO: 272 10(3)UL (ref 150–450)
POTASSIUM SERPL-SCNC: 4.8 MMOL/L (ref 3.5–5.1)
PROT SERPL-MCNC: 7.6 G/DL (ref 6.4–8.2)
PROT UR-MCNC: NEGATIVE MG/DL
RBC # BLD AUTO: 4.64 X10(6)UL
SODIUM SERPL-SCNC: 141 MMOL/L (ref 136–145)
SP GR UR STRIP: 1.02 (ref 1–1.03)
TRIGL SERPL-MCNC: 66 MG/DL (ref 30–149)
TSI SER-ACNC: 2.76 MIU/ML (ref 0.36–3.74)
UROBILINOGEN UR STRIP-ACNC: <2
VLDLC SERPL CALC-MCNC: 11 MG/DL (ref 0–30)
WBC # BLD AUTO: 6.7 X10(3) UL (ref 4–11)
YEAST UR QL: PRESENT /HPF

## 2022-01-14 PROCEDURE — 83036 HEMOGLOBIN GLYCOSYLATED A1C: CPT

## 2022-01-14 PROCEDURE — 80053 COMPREHEN METABOLIC PANEL: CPT

## 2022-01-14 PROCEDURE — 84443 ASSAY THYROID STIM HORMONE: CPT

## 2022-01-14 PROCEDURE — 3074F SYST BP LT 130 MM HG: CPT | Performed by: INTERNAL MEDICINE

## 2022-01-14 PROCEDURE — 81001 URINALYSIS AUTO W/SCOPE: CPT | Performed by: INTERNAL MEDICINE

## 2022-01-14 PROCEDURE — 3078F DIAST BP <80 MM HG: CPT | Performed by: INTERNAL MEDICINE

## 2022-01-14 PROCEDURE — 87086 URINE CULTURE/COLONY COUNT: CPT | Performed by: INTERNAL MEDICINE

## 2022-01-14 PROCEDURE — 80061 LIPID PANEL: CPT

## 2022-01-14 PROCEDURE — 3008F BODY MASS INDEX DOCD: CPT | Performed by: INTERNAL MEDICINE

## 2022-01-14 PROCEDURE — 85025 COMPLETE CBC W/AUTO DIFF WBC: CPT

## 2022-01-14 PROCEDURE — 99396 PREV VISIT EST AGE 40-64: CPT | Performed by: INTERNAL MEDICINE

## 2022-01-14 PROCEDURE — 36415 COLL VENOUS BLD VENIPUNCTURE: CPT

## 2022-01-14 NOTE — TELEPHONE ENCOUNTER
1/14/22–cbc cmp tsh lipid UA Z2v--A8w 5.9 UA 3–5 RBC. U CX pending. I sent patient my chart message.

## 2022-01-14 NOTE — PROGRESS NOTES
Nohemi Keenan is a 46year old female who presents for     Annual physical    Feels good. Asthma--No flares. Using xopenex inhaler prn. Had covid infection in 1/2021--sx resolved. Is vaccinated x3 to covid. Takes occas protonix for GERD. Smokeless tobacco: Never Used    Vaping Use      Vaping Use: Never used    Alcohol use:  Yes      Alcohol/week: 2.0 - 3.0 standard drinks      Types: 2 - 3 Glasses of wine per week      Comment: weekly    Drug use: No         Allergies:  No Known Allergies anxiety--occasionally takes lorazepam.    Healthcare maintenance:   Gyn Dr Barbosa 8/24/17--he rx prempro for menopausal sx--pt is going off prempro. Claudia Pretty finding. He ord mammo.    Colonoscopy-6/11/21  [facility]--

## 2022-01-18 NOTE — TELEPHONE ENCOUNTER
1/14/22–UA– small blood, 3–5 RBCs, urine culture negative. I sent patient [place] message to do CT urogram and see urology, Dr. Kennedy Swartz, again who saw her in the past for microscopic hematuria.     1. Microscopic hematuria  - CT UROGRAM(W+WO) W/3D(CPT=741

## 2022-01-28 ENCOUNTER — HOSPITAL ENCOUNTER (OUTPATIENT)
Dept: CT IMAGING | Facility: HOSPITAL | Age: 52
Discharge: HOME | End: 2022-01-28
Attending: INTERNAL MEDICINE
Payer: COMMERCIAL

## 2022-01-28 DIAGNOSIS — R31.29 MICROSCOPIC HEMATURIA: ICD-10-CM

## 2022-01-28 PROCEDURE — 76377 3D RENDER W/INTRP POSTPROCES: CPT | Performed by: INTERNAL MEDICINE

## 2022-01-28 PROCEDURE — 74178 CT ABD&PLV WO CNTR FLWD CNTR: CPT | Performed by: INTERNAL MEDICINE

## 2022-02-01 ENCOUNTER — TELEPHONE (OUTPATIENT)
Dept: INTERNAL MEDICINE CLINIC | Facility: CLINIC | Age: 52
End: 2022-02-01

## 2022-04-25 ENCOUNTER — E-VISIT (OUTPATIENT)
Dept: TELEHEALTH | Age: 52
End: 2022-04-25

## 2022-04-25 DIAGNOSIS — Z02.9 ADMINISTRATIVE ENCOUNTER: Primary | ICD-10-CM

## 2022-07-18 ENCOUNTER — HOSPITAL ENCOUNTER (OUTPATIENT)
Dept: MAMMOGRAPHY | Facility: HOSPITAL | Age: 52
Discharge: HOME | End: 2022-07-18
Attending: OBSTETRICS & GYNECOLOGY
Payer: COMMERCIAL

## 2022-07-18 DIAGNOSIS — Z12.31 ENCOUNTER FOR SCREENING MAMMOGRAM FOR MALIGNANT NEOPLASM OF BREAST: ICD-10-CM

## 2022-07-18 PROCEDURE — 77063 BREAST TOMOSYNTHESIS BI: CPT | Performed by: OBSTETRICS & GYNECOLOGY

## 2022-07-18 PROCEDURE — 77067 SCR MAMMO BI INCL CAD: CPT | Performed by: OBSTETRICS & GYNECOLOGY

## 2022-12-07 ENCOUNTER — HOSPITAL ENCOUNTER (OUTPATIENT)
Age: 52
Discharge: HOME | End: 2022-12-07
Payer: COMMERCIAL

## 2022-12-07 ENCOUNTER — TELEPHONE (OUTPATIENT)
Dept: INTERNAL MEDICINE CLINIC | Facility: CLINIC | Age: 52
End: 2022-12-07

## 2022-12-07 VITALS
TEMPERATURE: 99 F | DIASTOLIC BLOOD PRESSURE: 77 MMHG | SYSTOLIC BLOOD PRESSURE: 122 MMHG | HEART RATE: 91 BPM | RESPIRATION RATE: 16 BRPM | OXYGEN SATURATION: 98 %

## 2022-12-07 DIAGNOSIS — Z20.822 LAB TEST NEGATIVE FOR COVID-19 VIRUS: ICD-10-CM

## 2022-12-07 DIAGNOSIS — B34.9 VIRAL ILLNESS: Primary | ICD-10-CM

## 2022-12-07 LAB
POCT INFLUENZA A: NEGATIVE
POCT INFLUENZA B: NEGATIVE
SARS-COV-2 RNA RESP QL NAA+PROBE: NOT DETECTED

## 2022-12-07 PROCEDURE — U0002 COVID-19 LAB TEST NON-CDC: HCPCS | Performed by: NURSE PRACTITIONER

## 2022-12-07 PROCEDURE — 99213 OFFICE O/P EST LOW 20 MIN: CPT | Performed by: NURSE PRACTITIONER

## 2022-12-07 PROCEDURE — 87502 INFLUENZA DNA AMP PROBE: CPT | Performed by: NURSE PRACTITIONER

## 2022-12-07 RX ORDER — CODEINE PHOSPHATE AND GUAIFENESIN 10; 100 MG/5ML; MG/5ML
5 SOLUTION ORAL EVERY 6 HOURS PRN
Qty: 118 ML | Refills: 0 | Status: SHIPPED | OUTPATIENT
Start: 2022-12-07

## 2022-12-07 NOTE — DISCHARGE INSTRUCTIONS
Take all medication as prescribed to help with your symptoms take ibuprofen or Tylenol for pain. Drink plenty of fluids warm tea with honey. Follow-up with primary care provider if symptoms persist or worsen.

## 2022-12-07 NOTE — TELEPHONE ENCOUNTER
Respiratory infection triage:    Fever:  [x]  No fever  []  Fever>100.4    Cough:  [] Tight cough  [] Cough with exertion  [x] Dry cough  [] Sputum production, Color:     Breathing:  [x] Mild shortness of breath interfering with activity  [] Wheezing  [x] Pain with deep breathing  [] Using inhaler    Other symptoms:  [x] Sore throat  [] Difficulty swallowing  [x] Nasal drainage/congestion  [x] Sinus congestion/pressure  [] Ear pain  [x] Body aches  [x] Poor appetite  [] Loss of sense of smell   [] Loss of sense of taste  []Conjunctivitis? [] Any recent travel? [] Any sick contacts? [] Are you a healthcare worker? ADDITIONAL NOTES:  Please advise - called patient who started with SX last night , she had the chills , not anymore - home covid test today negative - to DR. CATALAN          Notified patient that we will route this message to the doctor and see what their recommendations would be. In the meantime, if anything worsens, they were advised to call back or seek emergent evaluation.

## 2022-12-07 NOTE — TELEPHONE ENCOUNTER
Please call patient  She has sore throat, chills, headache that started last night, during the night  Pt did at home COVID test, it was negative  Please call to discuss/advise  Tasked to nursing

## 2022-12-07 NOTE — TELEPHONE ENCOUNTER
Critical Care Patient seen in UC for cough /URI -covid negative,viral illness- on cough syrup with codeine

## 2022-12-12 ENCOUNTER — PATIENT MESSAGE (OUTPATIENT)
Dept: INTERNAL MEDICINE CLINIC | Facility: CLINIC | Age: 52
End: 2022-12-12

## 2022-12-12 RX ORDER — CODEINE PHOSPHATE AND GUAIFENESIN 10; 100 MG/5ML; MG/5ML
5 SOLUTION ORAL EVERY 6 HOURS PRN
Qty: 118 ML | Refills: 0 | Status: CANCELLED | OUTPATIENT
Start: 2022-12-12

## 2022-12-13 RX ORDER — BENZONATATE 100 MG/1
100 CAPSULE ORAL 3 TIMES DAILY PRN
Qty: 20 CAPSULE | Refills: 0 | Status: SHIPPED | OUTPATIENT
Start: 2022-12-13

## 2022-12-13 NOTE — TELEPHONE ENCOUNTER
From: Fabrizio Marie  To: Isabelle Lama MD  Sent: 12/12/2022 12:48 PM CST  Subject: Cough Syrup    Dr Hugh Yang,  The reason for the request for a refill for the cough syrup is that the pharmacy never fulfilled the first order. I have been using old cough syrup but I am about to run out and still have a very bad cough.      thank you,  Roxanna Joiner

## 2022-12-13 NOTE — TELEPHONE ENCOUNTER
Patient is calling checking on the status of her call back. Patient states she hoping the doctor can prescribe something for the cough. Patient states she needs something because she is not sleeping at all due to the cough keeping her up at night.     See messages below

## 2022-12-23 RX ORDER — LEVALBUTEROL TARTRATE 45 UG/1
2 AEROSOL, METERED ORAL EVERY 4 HOURS PRN
Qty: 1 EACH | Refills: 1 | Status: SHIPPED | OUTPATIENT
Start: 2022-12-23

## 2023-04-06 ENCOUNTER — TELEPHONE (OUTPATIENT)
Dept: INTERNAL MEDICINE CLINIC | Facility: CLINIC | Age: 53
End: 2023-04-06

## 2023-04-06 RX ORDER — CODEINE PHOSPHATE AND GUAIFENESIN 10; 100 MG/5ML; MG/5ML
5 SOLUTION ORAL EVERY 6 HOURS PRN
Qty: 118 ML | Refills: 0 | Status: SHIPPED | OUTPATIENT
Start: 2023-04-06

## 2023-04-24 ENCOUNTER — PATIENT MESSAGE (OUTPATIENT)
Dept: INTERNAL MEDICINE CLINIC | Facility: CLINIC | Age: 53
End: 2023-04-24

## 2023-04-24 NOTE — TELEPHONE ENCOUNTER
From: Ирина Sands  To: Tomi Kaur MD  Sent: 4/24/2023 1:45 PM CDT  Subject: pantoprazole 40 MG [facility]    Dr. Dominic Francois,    I am trying to refill this prescription but it is not allowing me to do so. I use this when the acid in my stomach is so bad that I am throwing up in the morning. This usually only occurs when I am very stressed, which I am right now due to the issues with my 's broken neck. Would you be able to prescribe a refill or do I need to come in for a visit or do a virtual visit.     Thank you,  Migel Servant  [phone]

## 2023-04-25 RX ORDER — PANTOPRAZOLE SODIUM 40 MG/1
40 TABLET, DELAYED RELEASE ORAL DAILY PRN
Qty: 90 TABLET | Refills: 3 | Status: SHIPPED | OUTPATIENT
Start: 2023-04-25

## 2023-05-05 ENCOUNTER — TELEPHONE (OUTPATIENT)
Dept: INTERNAL MEDICINE CLINIC | Facility: CLINIC | Age: 53
End: 2023-05-05

## 2023-05-05 ENCOUNTER — HOSPITAL ENCOUNTER (OUTPATIENT)
Age: 53
Discharge: HOME | End: 2023-05-05
Payer: COMMERCIAL

## 2023-05-05 VITALS
WEIGHT: 170 LBS | OXYGEN SATURATION: 97 % | HEIGHT: 67 IN | RESPIRATION RATE: 20 BRPM | HEART RATE: 98 BPM | TEMPERATURE: 99 F | SYSTOLIC BLOOD PRESSURE: 131 MMHG | BODY MASS INDEX: 26.68 KG/M2 | DIASTOLIC BLOOD PRESSURE: 82 MMHG

## 2023-05-05 DIAGNOSIS — U07.1 COVID-19: Primary | ICD-10-CM

## 2023-05-05 DIAGNOSIS — Z20.822 ENCOUNTER FOR LABORATORY TESTING FOR COVID-19 VIRUS: ICD-10-CM

## 2023-05-05 LAB — SARS-COV-2 RNA RESP QL NAA+PROBE: DETECTED

## 2023-05-05 PROCEDURE — U0002 COVID-19 LAB TEST NON-CDC: HCPCS | Performed by: NURSE PRACTITIONER

## 2023-05-05 PROCEDURE — 99213 OFFICE O/P EST LOW 20 MIN: CPT | Performed by: NURSE PRACTITIONER

## 2023-05-05 RX ORDER — NIRMATRELVIR AND RITONAVIR 300-100 MG
KIT ORAL
Qty: 30 TABLET | Refills: 0 | Status: SHIPPED | OUTPATIENT
Start: 2023-05-05 | End: 2023-05-10

## 2023-05-05 RX ORDER — BENZONATATE 200 MG/1
200 CAPSULE ORAL 3 TIMES DAILY PRN
Qty: 30 CAPSULE | Refills: 0 | Status: SHIPPED | OUTPATIENT
Start: 2023-05-05

## 2023-05-05 NOTE — ED INITIAL ASSESSMENT (HPI)
Pt presents to the IC with c/o a cough, nasal congestion, night sweats, fatigue, and sore throat since Tuesday.  No fevers

## 2023-05-05 NOTE — TELEPHONE ENCOUNTER
Patient is calling she has a cough, stuffed head, chills, night sweats, she is cold all the time. Symptoms started on Tuesday evening, she is feeling worse    Patient has not taken a covid test, she doesn't have one at home. Patient is requesting to be seen today there are no openings.     Phone [phone]

## 2023-05-05 NOTE — TELEPHONE ENCOUNTER
Spoke with pt, reports on 5/2 symptoms started with a scratchy throat. Reports symptoms below. States her cough is very tight, and feels chest tightness. Reports pain with deep breathing. Denies chest pain and SOB. Pt has hx of asthma, uses albuterol inhaler PRN. Denies using since symptom onset. Pt requests appt for today, advised no available openings. Advised pt to be evaluated in , She agrees and will go to [facility]. Home Covid Test   [x] No - Pt does not have home test    OTC Meds: Dayquil    Respiratory infection triage:  Fever:  [x] Temp:  97.9  [x] No fever  [] Fever>100.4  [x] Chills  [x] Night Sweats  [x] Body Aches    Cough:  [x] Tight cough  [] Cough with exertion  [] Dry cough  [] Sputum production    Breathing:  [] Shortness of breath with exertion   [] Shortness of breath at rest  [] Heavy breathing  [] Wheezing  [x] Pain with deep breathing  [] Using inhaler    GI Symptoms:  [x] Nausea   [] Vomiting   [] Diarrhea   [] Poor appetite     Other Symptoms:  [x] Sore throat  [] Difficulty swallowing  [x] Nasal drainage/congestion  [x] Sinus congestion/pressure  [x] Headache  [x] Fatigue   [] Weakness  [] Ear pain:  right side2/10  [] Conjunctivitis  [] Loss of sense of smell   [] Loss of sense of taste    Positive COVID Exposure (<6 feet, >15 mins. no mask)  [] Yes  [x] No    [x] Any sick contacts?  Pts son    Vaccinated [x] Yes  [] No  Booster [x] Yes  [] No    Nursing to F/U

## 2023-05-05 NOTE — DISCHARGE INSTRUCTIONS
You tested positive for COVID 19 today  Please quarantine for 5 days, beginning tomorrow.  After the 5 days, you can break quarantine as long as you can wear a mask at all times for an additional 5 days  Tessalon Perles 1 tablet every 8 hours as needed for cough  Paxlovid as prescribed  If you develop chest pain, shortness of breath or worsening symptoms, return to immediate care

## 2023-07-18 PROBLEM — Z86.32 HISTORY OF GESTATIONAL DIABETES: Status: ACTIVE | Noted: 2023-07-18

## 2023-07-18 PROBLEM — R73.03 PREDIABETES: Status: ACTIVE | Noted: 2019-11-01

## 2023-07-19 ENCOUNTER — OFFICE VISIT (OUTPATIENT)
Dept: OBGYN | Age: 53
End: 2023-07-19

## 2023-07-19 VITALS
WEIGHT: 173.72 LBS | HEART RATE: 64 BPM | SYSTOLIC BLOOD PRESSURE: 115 MMHG | OXYGEN SATURATION: 99 % | TEMPERATURE: 97.9 F | HEIGHT: 67 IN | BODY MASS INDEX: 27.27 KG/M2 | DIASTOLIC BLOOD PRESSURE: 77 MMHG

## 2023-07-19 DIAGNOSIS — Z12.31 SCREENING MAMMOGRAM FOR BREAST CANCER: ICD-10-CM

## 2023-07-19 DIAGNOSIS — Z01.419 GYNECOLOGIC EXAM NORMAL: Primary | ICD-10-CM

## 2023-07-19 DIAGNOSIS — Z12.4 ENCOUNTER FOR SCREENING FOR MALIGNANT NEOPLASM OF CERVIX: ICD-10-CM

## 2023-07-19 DIAGNOSIS — Z78.0 POSTMENOPAUSAL STATUS: ICD-10-CM

## 2023-07-19 DIAGNOSIS — Z86.32 HISTORY OF GESTATIONAL DIABETES: ICD-10-CM

## 2023-07-19 PROCEDURE — 99386 PREV VISIT NEW AGE 40-64: CPT | Performed by: OBSTETRICS & GYNECOLOGY

## 2023-08-28 ENCOUNTER — HOSPITAL ENCOUNTER (OUTPATIENT)
Dept: MAMMOGRAPHY | Facility: HOSPITAL | Age: 53
Discharge: HOME | End: 2023-08-28
Attending: OBSTETRICS & GYNECOLOGY
Payer: COMMERCIAL

## 2023-08-28 DIAGNOSIS — Z12.31 ENCOUNTER FOR MAMMOGRAM TO ESTABLISH BASELINE MAMMOGRAM: ICD-10-CM

## 2023-08-28 PROCEDURE — 77067 SCR MAMMO BI INCL CAD: CPT | Performed by: OBSTETRICS & GYNECOLOGY

## 2023-08-28 PROCEDURE — 77063 BREAST TOMOSYNTHESIS BI: CPT | Performed by: OBSTETRICS & GYNECOLOGY

## 2023-09-23 NOTE — ANESTHESIA POSTPROCEDURE EVALUATION
Patient: Saul Mejias    Procedure Summary     Date:  10/19/18 Room / Location:  [address] [place] [address] MAIN OR 02 / [address] [place] [address] MAIN OR    Anesthesia Start:  1033 Anesthesia Stop:      Procedure:  HYSTEROSCOPY DIAGNOSTIC (N/A ) Diagnosis:  (Retained IUD)    Surgeon:  Irais Gomez (0) Normal; no sensory loss

## 2023-09-30 NOTE — TELEPHONE ENCOUNTER
Bed: 47  Expected date:   Expected time:   Means of arrival:   Comments:  TRIAGE   Please notify patient that her flu test was negative. Does likely have a viral syndrome. Encouraged hydration, rest, mucinex, flonase. Can take tylenol alternating with motrin with muscle aches and fevers. Steam can also help.    If symptoms worsen, arun

## 2023-11-08 ENCOUNTER — HOSPITAL ENCOUNTER (EMERGENCY)
Facility: HOSPITAL | Age: 53
Discharge: HOME | End: 2023-11-08
Attending: EMERGENCY MEDICINE
Payer: COMMERCIAL

## 2023-11-08 ENCOUNTER — APPOINTMENT (OUTPATIENT)
Dept: GENERAL RADIOLOGY | Facility: HOSPITAL | Age: 53
End: 2023-11-08
Payer: COMMERCIAL

## 2023-11-08 ENCOUNTER — APPOINTMENT (OUTPATIENT)
Dept: CT IMAGING | Facility: HOSPITAL | Age: 53
End: 2023-11-08
Attending: EMERGENCY MEDICINE
Payer: COMMERCIAL

## 2023-11-08 ENCOUNTER — TELEPHONE (OUTPATIENT)
Dept: INTERNAL MEDICINE CLINIC | Facility: CLINIC | Age: 53
End: 2023-11-08

## 2023-11-08 VITALS
HEIGHT: 67 IN | SYSTOLIC BLOOD PRESSURE: 115 MMHG | DIASTOLIC BLOOD PRESSURE: 77 MMHG | RESPIRATION RATE: 16 BRPM | HEART RATE: 67 BPM | OXYGEN SATURATION: 98 % | BODY MASS INDEX: 26.68 KG/M2 | WEIGHT: 170 LBS | TEMPERATURE: 98 F

## 2023-11-08 DIAGNOSIS — R07.89 CHEST PAIN, ATYPICAL: Primary | ICD-10-CM

## 2023-11-08 LAB
ALBUMIN SERPL-MCNC: 4.5 G/DL (ref 3.2–4.8)
ALBUMIN/GLOB SERPL: 1.4 {RATIO} (ref 1–2)
ALP LIVER SERPL-CCNC: 71 U/L
ALT SERPL-CCNC: 15 U/L
ANION GAP SERPL CALC-SCNC: 7 MMOL/L (ref 0–18)
AST SERPL-CCNC: 19 U/L (ref ?–34)
ATRIAL RATE: 68 BPM
BASOPHILS # BLD AUTO: 0.04 X10(3) UL (ref 0–0.2)
BASOPHILS NFR BLD AUTO: 0.5 %
BILIRUB SERPL-MCNC: 0.6 MG/DL (ref 0.3–1.2)
BUN BLD-MCNC: 18 MG/DL (ref 9–23)
BUN/CREAT SERPL: 19.6 (ref 10–20)
CALCIUM BLD-MCNC: 9.5 MG/DL (ref 8.7–10.4)
CHLORIDE SERPL-SCNC: 108 MMOL/L (ref 98–112)
CO2 SERPL-SCNC: 26 MMOL/L (ref 21–32)
CREAT BLD-MCNC: 0.92 MG/DL
DEPRECATED RDW RBC AUTO: 44.4 FL (ref 35.1–46.3)
EGFRCR SERPLBLD CKD-EPI 2021: 74 ML/MIN/1.73M2 (ref 60–?)
EOSINOPHIL # BLD AUTO: 0.13 X10(3) UL (ref 0–0.7)
EOSINOPHIL NFR BLD AUTO: 1.6 %
ERYTHROCYTE [DISTWIDTH] IN BLOOD BY AUTOMATED COUNT: 13.4 % (ref 11–15)
GLOBULIN PLAS-MCNC: 3.2 G/DL (ref 2.8–4.4)
GLUCOSE BLD-MCNC: 90 MG/DL (ref 70–99)
HCT VFR BLD AUTO: 43 %
HGB BLD-MCNC: 14 G/DL
IMM GRANULOCYTES # BLD AUTO: 0.02 X10(3) UL (ref 0–1)
IMM GRANULOCYTES NFR BLD: 0.2 %
LYMPHOCYTES # BLD AUTO: 2.28 X10(3) UL (ref 1–4)
LYMPHOCYTES NFR BLD AUTO: 28.1 %
MCH RBC QN AUTO: 29.3 PG (ref 26–34)
MCHC RBC AUTO-ENTMCNC: 32.6 G/DL (ref 31–37)
MCV RBC AUTO: 90 FL
MONOCYTES # BLD AUTO: 0.71 X10(3) UL (ref 0.1–1)
MONOCYTES NFR BLD AUTO: 8.8 %
NEUTROPHILS # BLD AUTO: 4.92 X10 (3) UL (ref 1.5–7.7)
NEUTROPHILS # BLD AUTO: 4.92 X10(3) UL (ref 1.5–7.7)
NEUTROPHILS NFR BLD AUTO: 60.8 %
OSMOLALITY SERPL CALC.SUM OF ELEC: 293 MOSM/KG (ref 275–295)
P AXIS: 6 DEGREES
P-R INTERVAL: 120 MS
PLATELET # BLD AUTO: 270 10(3)UL (ref 150–450)
POTASSIUM SERPL-SCNC: 4.5 MMOL/L (ref 3.5–5.1)
PROT SERPL-MCNC: 7.7 G/DL (ref 5.7–8.2)
Q-T INTERVAL: 398 MS
QRS DURATION: 84 MS
QTC CALCULATION (BEZET): 423 MS
R AXIS: 45 DEGREES
RBC # BLD AUTO: 4.78 X10(6)UL
SODIUM SERPL-SCNC: 141 MMOL/L (ref 136–145)
T AXIS: 36 DEGREES
TROPONIN I SERPL HS-MCNC: <3 NG/L
VENTRICULAR RATE: 68 BPM
WBC # BLD AUTO: 8.1 X10(3) UL (ref 4–11)

## 2023-11-08 PROCEDURE — 71045 X-RAY EXAM CHEST 1 VIEW: CPT

## 2023-11-08 PROCEDURE — 85025 COMPLETE CBC W/AUTO DIFF WBC: CPT | Performed by: EMERGENCY MEDICINE

## 2023-11-08 PROCEDURE — 93010 ELECTROCARDIOGRAM REPORT: CPT

## 2023-11-08 PROCEDURE — 36415 COLL VENOUS BLD VENIPUNCTURE: CPT

## 2023-11-08 PROCEDURE — 80053 COMPREHEN METABOLIC PANEL: CPT | Performed by: EMERGENCY MEDICINE

## 2023-11-08 PROCEDURE — 71260 CT THORAX DX C+: CPT | Performed by: EMERGENCY MEDICINE

## 2023-11-08 PROCEDURE — 99285 EMERGENCY DEPT VISIT HI MDM: CPT

## 2023-11-08 PROCEDURE — 84484 ASSAY OF TROPONIN QUANT: CPT | Performed by: EMERGENCY MEDICINE

## 2023-11-08 PROCEDURE — 93005 ELECTROCARDIOGRAM TRACING: CPT

## 2023-11-08 PROCEDURE — 99284 EMERGENCY DEPT VISIT MOD MDM: CPT

## 2023-11-08 RX ORDER — PANTOPRAZOLE SODIUM 40 MG/1
40 TABLET, DELAYED RELEASE ORAL DAILY PRN
Qty: 90 TABLET | Refills: 3 | Status: CANCELLED | OUTPATIENT
Start: 2023-11-08

## 2023-11-08 RX ORDER — PANTOPRAZOLE SODIUM 40 MG/1
40 TABLET, DELAYED RELEASE ORAL DAILY
Qty: 30 TABLET | Refills: 0 | Status: SHIPPED | OUTPATIENT
Start: 2023-11-08 | End: 2023-12-08

## 2023-11-08 RX ORDER — IOHEXOL 350 MG/ML
80 INJECTION, SOLUTION INTRAVENOUS
Status: COMPLETED | OUTPATIENT
Start: 2023-11-08 | End: 2023-11-08

## 2023-11-08 NOTE — ED INITIAL ASSESSMENT (HPI)
47 y/o female arrives with x 1 week of intermittent chest pain radiating to her upper back. Pain is 4/10 at it's worst. Hx of GERD and asthma. Denies SOB, fever/chills.

## 2023-11-08 NOTE — TELEPHONE ENCOUNTER
As FYI to DR. Reina Packer - called patient who started having chest tightness on and off the past couple days , also middle upper back .  2 nights ago she started with vertigo , only when lying down and turning quickly - due to chest discomfort RN advised to be evaluated in ER - agreed F/U 11/9

## 2023-11-08 NOTE — TELEPHONE ENCOUNTER
Pt called to schedule an appt with Dr Amor Blas states she's experiencing tightness in her chest  Last night pt woke up with a \"little vertigo thing\" and the tightness  Pt describes it -  like someone is pushing on her chest & upper back   Pt has been stressed lately - thought that might be the reason but its not resolving     Please call to triage for appointment [phone]

## 2023-11-09 ENCOUNTER — TELEPHONE (OUTPATIENT)
Dept: INTERNAL MEDICINE CLINIC | Facility: CLINIC | Age: 53
End: 2023-11-09

## 2023-11-10 NOTE — TELEPHONE ENCOUNTER
1 year was sent in April     Current refill request refused due to refill is either a duplicate request or has active refills at the pharmacy. Check previous templates. Requested Prescriptions     Pending Prescriptions Disp Refills    pantoprazole (PROTONIX) 40 MG Oral Tab EC 90 tablet 3     Sig: Take 1 tablet (40 mg total) by mouth daily as needed.

## 2023-11-10 NOTE — TELEPHONE ENCOUNTER
From: Jonathan Martinez  Sent: 11/9/2023 8:04 AM CST  To: Nayana Ranken [facility] Clinical Staff  Subject: Dr Thomas August    Can you please let me know if she has any evening appointments open?

## 2023-11-13 ENCOUNTER — PATIENT MESSAGE (OUTPATIENT)
Dept: INTERNAL MEDICINE CLINIC | Facility: CLINIC | Age: 53
End: 2023-11-13

## 2023-12-12 ENCOUNTER — TELEPHONE (OUTPATIENT)
Dept: INTERNAL MEDICINE CLINIC | Facility: CLINIC | Age: 53
End: 2023-12-12

## 2023-12-12 ENCOUNTER — PATIENT MESSAGE (OUTPATIENT)
Dept: INTERNAL MEDICINE CLINIC | Facility: CLINIC | Age: 53
End: 2023-12-12

## 2023-12-12 DIAGNOSIS — Z00.00 ANNUAL PHYSICAL EXAM: ICD-10-CM

## 2023-12-12 DIAGNOSIS — R73.01 IMPAIRED FASTING GLUCOSE: Primary | ICD-10-CM

## 2023-12-12 NOTE — TELEPHONE ENCOUNTER
----- Message from Jaquelin De Leon sent at 12/12/2023  2:12 PM CST -----  Regarding: Blood Work before 12/13 visit  Contact: [phone]  Was I supposed to get an order for blood work before my visit tomorrow evening?     Thank you

## 2023-12-12 NOTE — TELEPHONE ENCOUNTER
Labs ordered: lipid panel, tsh, hemoglobin A1c, UA to be obtained fasting.    If she can't do it before the visit no worries she can do it after.    Thank you!

## 2023-12-13 ENCOUNTER — OFFICE VISIT (OUTPATIENT)
Dept: INTERNAL MEDICINE CLINIC | Facility: CLINIC | Age: 53
End: 2023-12-13

## 2023-12-13 VITALS
OXYGEN SATURATION: 99 % | SYSTOLIC BLOOD PRESSURE: 110 MMHG | DIASTOLIC BLOOD PRESSURE: 64 MMHG | BODY MASS INDEX: 27.17 KG/M2 | HEART RATE: 74 BPM | WEIGHT: 179.25 LBS | HEIGHT: 68 IN

## 2023-12-13 DIAGNOSIS — Z00.00 ANNUAL PHYSICAL EXAM: Primary | ICD-10-CM

## 2023-12-13 PROCEDURE — 90471 IMMUNIZATION ADMIN: CPT | Performed by: INTERNAL MEDICINE

## 2023-12-13 PROCEDURE — 90686 IIV4 VACC NO PRSV 0.5 ML IM: CPT | Performed by: INTERNAL MEDICINE

## 2023-12-13 PROCEDURE — 3078F DIAST BP <80 MM HG: CPT | Performed by: INTERNAL MEDICINE

## 2023-12-13 PROCEDURE — 3074F SYST BP LT 130 MM HG: CPT | Performed by: INTERNAL MEDICINE

## 2023-12-13 PROCEDURE — 3008F BODY MASS INDEX DOCD: CPT | Performed by: INTERNAL MEDICINE

## 2023-12-13 PROCEDURE — 99396 PREV VISIT EST AGE 40-64: CPT | Performed by: INTERNAL MEDICINE

## 2023-12-18 RX ORDER — PANTOPRAZOLE SODIUM 40 MG/1
40 TABLET, DELAYED RELEASE ORAL DAILY PRN
Qty: 90 TABLET | Refills: 3 | Status: CANCELLED | OUTPATIENT
Start: 2023-12-18

## 2023-12-19 NOTE — TELEPHONE ENCOUNTER
Pt has active refills until April     Current refill request refused due to refill is either a duplicate request or has active refills at the pharmacy. Check previous templates. Requested Prescriptions     Pending Prescriptions Disp Refills    pantoprazole (PROTONIX) 40 MG Oral Tab EC 90 tablet 3     Sig: Take 1 tablet (40 mg total) by mouth daily as needed.

## 2023-12-22 ENCOUNTER — PATIENT MESSAGE (OUTPATIENT)
Dept: INTERNAL MEDICINE CLINIC | Facility: CLINIC | Age: 53
End: 2023-12-22

## 2023-12-22 NOTE — TELEPHONE ENCOUNTER
From: Fowler Shown  To: Bob Carrillo  Sent: 12/22/2023 10:46 AM CST  Subject: Drug Refill and Blood Test    Dr Tremayne Layne,    I am trying to get a refill on my acid reflux medication, but it keeps getting denied. Also can I just walk into somewhere at your location and get the blood work done?      Thank you,  Jerald Crews

## 2023-12-22 NOTE — TELEPHONE ENCOUNTER
Spoke to catarino and advised that she does still have refills for her pantoprazole, from when  last ordered , she stated that she went ot the ER last month and they gave her a 30 day supply, so advised the patient I would be calling her pharmacy to make sure they still honor her original rx , called pharmacy and advised regarding medication, they stated they can definitely still honor that refill and will go ahead and fill that for patient, pt was made aware and will await her rx to be completed by pharmacy.

## 2024-08-15 ENCOUNTER — TELEPHONE (OUTPATIENT)
Dept: INTERNAL MEDICINE CLINIC | Facility: CLINIC | Age: 54
End: 2024-08-15

## 2024-08-15 ENCOUNTER — PATIENT MESSAGE (OUTPATIENT)
Dept: INTERNAL MEDICINE CLINIC | Facility: CLINIC | Age: 54
End: 2024-08-15

## 2024-08-15 NOTE — TELEPHONE ENCOUNTER
Spoke with patient and relayed MD's message. Verbalized understanding and agreement with plan.   Appointment scheduled for 8/19 with PCP.   UC/ER advised for new/worsening symptoms.

## 2024-08-15 NOTE — TELEPHONE ENCOUNTER
To Dr. Taveras (on-call) please advise.     Patient reports RT upper back/shoulder blade pain x 3-4 months. Denies fall, injury, trauma or heavy lifting.  Pain is localized. Does not radiate. When pain originally occurred, it was intermittent.   It has become constant in the last 2 weeks.   Described as pressure pain. 6/10 (at it's worse), currently 5/10.   Aggravated by coughing and deep breathing.     Reports URI that started 7/29. Overall symptoms resolved, but still has a lingering cough and runny nose. Coughing intensifies the shoulder blade pain.   No fever/chills.  Reports shortness of breath with activity. Resolves within 1 minute with rest. Contributes to asthma. Has not had to use inhaler.     Patient denies chest pain/pressure, dizziness, lightheadedness, changes in vision, or n/v. No numbness or tingling.     UC/ER advised. Discuss need to rule out cardiac involvement; referred pain. Pt declined. States, she doesn't want to sit in a waiting room of sick people. Requested to be seen in-office.    Next available appointment is Monday.     Patient was notified that this message will be routed to the physician to determine what their recommendation (s) would be. In the meantime, if they develop new or worsening symptoms, they were advised to seek emergent evaluation at the ER. ER precautions reviewed.   Reviewed business hours and the after-hour service for the on-call physician, I.e, concerns/questions.

## 2024-08-15 NOTE — TELEPHONE ENCOUNTER
From: Jaquelin De Leon  To: Mckenzie Polo  Sent: 8/15/2024 11:06 AM CDT  Subject: blood test and request an appointment    First - I completely forgot to get my bloodwork done, is that order still available to use?    Second - I have been having pain in by my right should blade on and off for a few months.  Most painful when I take a deep breath.  I got sick a few weeks ago and now the pain is consistently there and even more painful when I cough or take deep breaths.  Should I come in to see you about this or first get an xray or something done.    thank you,  Jaquelin De Leon  [phone]

## 2024-08-15 NOTE — TELEPHONE ENCOUNTER
From: Jaquelin De Leon  To: Mckenzie Polo  Sent: 8/15/2024 11:06 AM CDT  Subject: blood test and request an appointment    First - I completely forgot to get my bloodwork done, is that order still available to use?     Second - I have been having pain in by my right should blade on and off for a few months. Most painful when I take a deep breath. I got sick a few weeks ago and now the pain is consistently there and even more painful when I cough or take deep breaths. Should I come in to see you about this or first get an xray or something done.    thank you,  Jaquelin De Leon   [phone]

## 2024-08-15 NOTE — TELEPHONE ENCOUNTER
This does not sound cardiac.  I think okay to wait, as it has been going on for a few months already

## 2024-08-19 ENCOUNTER — OFFICE VISIT (OUTPATIENT)
Dept: INTERNAL MEDICINE CLINIC | Facility: CLINIC | Age: 54
End: 2024-08-19

## 2024-08-19 ENCOUNTER — LAB ENCOUNTER (OUTPATIENT)
Dept: LAB | Age: 54
End: 2024-08-19
Attending: INTERNAL MEDICINE
Payer: COMMERCIAL

## 2024-08-19 VITALS
HEART RATE: 88 BPM | HEIGHT: 68 IN | WEIGHT: 178 LBS | BODY MASS INDEX: 26.98 KG/M2 | SYSTOLIC BLOOD PRESSURE: 96 MMHG | OXYGEN SATURATION: 100 % | DIASTOLIC BLOOD PRESSURE: 60 MMHG

## 2024-08-19 DIAGNOSIS — Z00.00 ANNUAL PHYSICAL EXAM: ICD-10-CM

## 2024-08-19 DIAGNOSIS — R73.01 IMPAIRED FASTING GLUCOSE: ICD-10-CM

## 2024-08-19 DIAGNOSIS — R05.9 COUGH, UNSPECIFIED TYPE: Primary | ICD-10-CM

## 2024-08-19 LAB
BILIRUB UR QL: NEGATIVE
CHOLEST SERPL-MCNC: 176 MG/DL (ref ?–200)
COLOR UR: YELLOW
EST. AVERAGE GLUCOSE BLD GHB EST-MCNC: 128 MG/DL (ref 68–126)
FASTING PATIENT LIPID ANSWER: YES
GLUCOSE UR-MCNC: NORMAL MG/DL
HBA1C MFR BLD: 6.1 % (ref ?–5.7)
HDLC SERPL-MCNC: 58 MG/DL (ref 40–59)
KETONES UR-MCNC: NEGATIVE MG/DL
LDLC SERPL CALC-MCNC: 98 MG/DL (ref ?–100)
LEUKOCYTE ESTERASE UR QL STRIP.AUTO: 250
NITRITE UR QL STRIP.AUTO: NEGATIVE
NONHDLC SERPL-MCNC: 118 MG/DL (ref ?–130)
PH UR: 5.5 [PH] (ref 5–8)
PROT UR-MCNC: 30 MG/DL
SP GR UR STRIP: 1.03 (ref 1–1.03)
TRIGL SERPL-MCNC: 115 MG/DL (ref 30–149)
TSI SER-ACNC: 2.68 MIU/ML (ref 0.55–4.78)
UROBILINOGEN UR STRIP-ACNC: NORMAL
VLDLC SERPL CALC-MCNC: 19 MG/DL (ref 0–30)
WBC #/AREA URNS AUTO: >50 /HPF

## 2024-08-19 PROCEDURE — 80061 LIPID PANEL: CPT

## 2024-08-19 PROCEDURE — 87086 URINE CULTURE/COLONY COUNT: CPT

## 2024-08-19 PROCEDURE — 3074F SYST BP LT 130 MM HG: CPT | Performed by: INTERNAL MEDICINE

## 2024-08-19 PROCEDURE — 99214 OFFICE O/P EST MOD 30 MIN: CPT | Performed by: INTERNAL MEDICINE

## 2024-08-19 PROCEDURE — 84443 ASSAY THYROID STIM HORMONE: CPT

## 2024-08-19 PROCEDURE — 3008F BODY MASS INDEX DOCD: CPT | Performed by: INTERNAL MEDICINE

## 2024-08-19 PROCEDURE — 81001 URINALYSIS AUTO W/SCOPE: CPT

## 2024-08-19 PROCEDURE — 83036 HEMOGLOBIN GLYCOSYLATED A1C: CPT

## 2024-08-19 PROCEDURE — 36415 COLL VENOUS BLD VENIPUNCTURE: CPT

## 2024-08-19 PROCEDURE — 3078F DIAST BP <80 MM HG: CPT | Performed by: INTERNAL MEDICINE

## 2024-08-19 NOTE — PROGRESS NOTES
Jaquelin De Leon is a 54 year old female.  Chief Complaint   Patient presents with    Checkup     Right Shoulder Pain x3 months; Worse since Cough started 3 weeks ago. \"hurts to breathe and cough\"     HPI:   Jaquelin De Leon is a 54 year old female who presents for cough and R shoulder pain.    C/o R shoulder blade pain \"soreness\" for a few months. Thought it was a muscle ache, was intermittent and went for massage/chiropractor which helped.     She then had a URI 7/2024 w/fever x1 day, \"loaded herself up\" on dayquil/nyquil. Didn't test for COVID or seek medical attention at the time. Cough has persisted since now causing pleuritic pain. Denies hemoptysis, LE edema. Denies RUQ ab pain, n/v. Chronic GERD sx, unchanged. States possibly some residual PND. States R shoulder blade pain worse w/coughing.    Wt Readings from Last 6 Encounters:   08/19/24 178 lb (80.7 kg)   12/13/23 179 lb 4 oz (81.3 kg)   11/08/23 170 lb (77.1 kg)   05/05/23 170 lb (77.1 kg)   01/14/22 159 lb 3.2 oz (72.2 kg)   01/20/21 161 lb (73 kg)     Body mass index is 27.06 kg/m².     Current Outpatient Medications   Medication Sig Dispense Refill    Levalbuterol Tartrate (XOPENEX HFA) 45 MCG/ACT Inhalation Aerosol Inhale 2 puffs into the lungs every 4 (four) hours as needed for Wheezing or Shortness of Breath. 1 each 1      Past Medical History:    Anesthesia complication    BLOOD PRESSURE DROPPED    Anxiety    Asthma (HCC)    Back problem    COVID-19    outpatient    Cyst near tailbone    removed    Esophageal reflux    EGD 2012-Dr Dobson--esophagitis and min gastric erythema    Gestational diabetes (HCC)    Meningitis due to viruses (HCC)    Microscopic hematuria    CT urogram 12/24/15 negative on conclusion (8 mm cyst R kidney) to follow-up US per Dr. De La Rosa 1 year), cystoscopy and cytology negative.. renal US 10/2016-R renal simple cyst 9 mm.    Osteoarthritis    Prediabetes    hx gestational diabetes treated with insulin when pregnant     Sciatica of right side    Vertigo    MRI brain w & w/o contrast 12/16/15-results neg. Dr. Clemens referred to vestibular therapy, also in 2020    Visual impairment      Past Surgical History:   Procedure Laterality Date    Colonoscopy  06/2021    normal    Egd  03/2012    Lasik      Other  10/19/2018    IUD surgically removed    Other surgical history      5th digit cyst removed (L) foot 1995-96     Other surgical history      cyst removed from lower back      Family History   Problem Relation Age of Onset    Heart Disorder Mother         irregular heart beat    Heart Disorder Father     Lipids Father     Hypertension Father     Heart Attack Father 48    High Blood Pressure Sister     High Blood Pressure Brother     High Blood Pressure Brother     Breast Cancer Paternal Aunt         late 50's    Cancer Maternal Cousin Female         mid 40s pancreatic    Cancer Paternal Cousin Female     Other (2 brothers, 1 sister) Other       Social History:   Social History     Socioeconomic History    Marital status:    Tobacco Use    Smoking status: Never    Smokeless tobacco: Never   Vaping Use    Vaping status: Never Used   Substance and Sexual Activity    Alcohol use: Yes     Alcohol/week: 2.0 - 3.0 standard drinks of alcohol     Types: 2 - 3 Glasses of wine per week     Comment: weekly    Drug use: No   Other Topics Concern    Caffeine Concern Yes     Comment: Coffee          REVIEW OF SYSTEMS:   GENERAL: feels well otherwise, denies f/c  HEENT: + nasal congestion, denies sinus pain, sore throat  LUNGS: denies shortness of breath with exertion, + cough  CARDIOVASCULAR: denies chest pain, pressure, or palpitations  GI: denies abdominal pain, nausea, vomiting, diarrhea, constipation, hematochezia, or melena  : denies dysuria, hematuria  MSK: + R shoulder blade pain  EXT: denies edema    EXAM:   BP 96/60   Pulse 88   Ht 5' 8\" (1.727 m)   Wt 178 lb (80.7 kg)   SpO2 100%   BMI 27.06 kg/m²     GENERAL: well developed,  well nourished, in no apparent distress  LUNGS: clear to auscultation b/l, no w/r/r  CARDIO: RRR, normal S1S2, no m/r/g  GI: soft, NT, ND, NABS, no HSM  EXTREMITIES: no c/c/e, +2 DP pulses bilaterally  MSK: + ttp R upper thoracic paraspinal, shoulder full active ROM w/o pain upon manipulation  NEURO: A&O x 3, moves all 4 extremities spontaneously    ASSESSMENT AND PLAN:   Jaquelin De Leon is a 54 year old female who presents for cough and R shoulder pain.    Cough, unspecified type  - likely post-viral, advised pt sx may continue for up to 8 weeks post uri   - r/o VTE w/d-dimer, exam reassuring however will check CXR  - can try antihistamine/flonase to minimize PND sx  - advised control of GERD sx  - XR CHEST PA + LAT CHEST (CPT=71046); Future  - D-Dimer [E]; Future    R back pain  - likely MSK strain/pleurisy  - advised trial of NSAIDs w/food PRN     RTC as previously scheduled or sooner PRN.    For E/M code - 30 minutes spent reviewing performing chart review, obtaining a history, performing a physical exam, reviewing the assessment/plan, placing orders, and completing documentation.     Mckenzie Polo DO  8/19/2024  8:18 AM

## 2024-08-23 ENCOUNTER — TELEPHONE (OUTPATIENT)
Dept: INTERNAL MEDICINE CLINIC | Facility: CLINIC | Age: 54
End: 2024-08-23

## 2024-08-23 DIAGNOSIS — R31.29 MICROSCOPIC HEMATURIA: Primary | ICD-10-CM

## 2024-09-18 ENCOUNTER — LAB ENCOUNTER (OUTPATIENT)
Dept: LAB | Facility: HOSPITAL | Age: 54
End: 2024-09-18
Attending: INTERNAL MEDICINE
Payer: COMMERCIAL

## 2024-09-18 ENCOUNTER — HOSPITAL ENCOUNTER (OUTPATIENT)
Dept: GENERAL RADIOLOGY | Facility: HOSPITAL | Age: 54
Discharge: HOME | End: 2024-09-18
Attending: INTERNAL MEDICINE
Payer: COMMERCIAL

## 2024-09-18 DIAGNOSIS — R05.9 COUGH, UNSPECIFIED TYPE: ICD-10-CM

## 2024-09-18 DIAGNOSIS — R31.29 MICROSCOPIC HEMATURIA: ICD-10-CM

## 2024-09-18 LAB
BILIRUB UR QL: NEGATIVE
CLARITY UR: CLEAR
COLOR UR: COLORLESS
D DIMER PPP FEU-MCNC: 0.31 UG/ML FEU (ref ?–0.54)
GLUCOSE UR-MCNC: NORMAL MG/DL
HGB UR QL STRIP.AUTO: NEGATIVE
KETONES UR-MCNC: NEGATIVE MG/DL
LEUKOCYTE ESTERASE UR QL STRIP.AUTO: NEGATIVE
NITRITE UR QL STRIP.AUTO: NEGATIVE
PH UR: 5.5 [PH] (ref 5–8)
PROT UR-MCNC: NEGATIVE MG/DL
SP GR UR STRIP: <1.005 (ref 1–1.03)
UROBILINOGEN UR STRIP-ACNC: NORMAL

## 2024-09-18 PROCEDURE — 71046 X-RAY EXAM CHEST 2 VIEWS: CPT | Performed by: INTERNAL MEDICINE

## 2024-09-18 PROCEDURE — 36415 COLL VENOUS BLD VENIPUNCTURE: CPT

## 2024-09-18 PROCEDURE — 85379 FIBRIN DEGRADATION QUANT: CPT

## 2024-09-18 PROCEDURE — 81003 URINALYSIS AUTO W/O SCOPE: CPT

## 2024-09-19 ENCOUNTER — TELEPHONE (OUTPATIENT)
Dept: INTERNAL MEDICINE CLINIC | Facility: CLINIC | Age: 54
End: 2024-09-19

## 2024-09-25 ENCOUNTER — APPOINTMENT (OUTPATIENT)
Dept: OBGYN | Age: 54
End: 2024-09-25

## 2024-09-25 VITALS
TEMPERATURE: 97.6 F | DIASTOLIC BLOOD PRESSURE: 83 MMHG | OXYGEN SATURATION: 99 % | HEIGHT: 67 IN | HEART RATE: 62 BPM | BODY MASS INDEX: 28.01 KG/M2 | SYSTOLIC BLOOD PRESSURE: 122 MMHG | WEIGHT: 178.46 LBS

## 2024-09-25 DIAGNOSIS — Z78.0 POSTMENOPAUSAL STATUS: ICD-10-CM

## 2024-09-25 DIAGNOSIS — Z86.32 HISTORY OF GESTATIONAL DIABETES: ICD-10-CM

## 2024-09-25 DIAGNOSIS — Z12.31 ENCOUNTER FOR SCREENING MAMMOGRAM FOR MALIGNANT NEOPLASM OF BREAST: ICD-10-CM

## 2024-09-25 DIAGNOSIS — Z12.4 ENCOUNTER FOR SCREENING FOR MALIGNANT NEOPLASM OF CERVIX: ICD-10-CM

## 2024-09-25 DIAGNOSIS — Z01.419 ENCOUNTER FOR GYNECOLOGICAL EXAMINATION (GENERAL) (ROUTINE) WITHOUT ABNORMAL FINDINGS: Primary | ICD-10-CM

## 2024-09-25 SDOH — ECONOMIC STABILITY: HOUSING INSECURITY: DO YOU HAVE PROBLEMS WITH ANY OF THE FOLLOWING?: NONE OF THE ABOVE

## 2024-09-25 SDOH — ECONOMIC STABILITY: FOOD INSECURITY: WITHIN THE PAST 12 MONTHS, THE FOOD YOU BOUGHT JUST DIDN'T LAST AND YOU DIDN'T HAVE MONEY TO GET MORE.: NEVER TRUE

## 2024-09-25 SDOH — ECONOMIC STABILITY: GENERAL: WOULD YOU LIKE HELP WITH ANY OF THE FOLLOWING NEEDS?: I DON'T WANT HELP WITH ANY OF THESE

## 2024-09-25 SDOH — ECONOMIC STABILITY: HOUSING INSECURITY: WHAT IS YOUR LIVING SITUATION TODAY?: I HAVE A STEADY PLACE TO LIVE

## 2024-09-25 SDOH — ECONOMIC STABILITY: TRANSPORTATION INSECURITY
IN THE PAST 12 MONTHS, HAS LACK OF RELIABLE TRANSPORTATION KEPT YOU FROM MEDICAL APPOINTMENTS, MEETINGS, WORK OR FROM GETTING THINGS NEEDED FOR DAILY LIVING?: NO

## 2024-09-25 ASSESSMENT — SOCIAL DETERMINANTS OF HEALTH (SDOH): IN THE PAST 12 MONTHS, HAS THE ELECTRIC, GAS, OIL, OR WATER COMPANY THREATENED TO SHUT OFF SERVICE IN YOUR HOME?: NO

## 2024-10-02 ENCOUNTER — OFFICE VISIT (OUTPATIENT)
Dept: INTERNAL MEDICINE CLINIC | Facility: CLINIC | Age: 54
End: 2024-10-02

## 2024-10-02 VITALS
BODY MASS INDEX: 26.83 KG/M2 | OXYGEN SATURATION: 99 % | HEART RATE: 54 BPM | SYSTOLIC BLOOD PRESSURE: 104 MMHG | WEIGHT: 177 LBS | HEIGHT: 68 IN | DIASTOLIC BLOOD PRESSURE: 62 MMHG

## 2024-10-02 DIAGNOSIS — R07.81 PLEURITIC CHEST PAIN: Primary | ICD-10-CM

## 2024-10-02 PROCEDURE — 90471 IMMUNIZATION ADMIN: CPT | Performed by: INTERNAL MEDICINE

## 2024-10-02 PROCEDURE — 99214 OFFICE O/P EST MOD 30 MIN: CPT | Performed by: INTERNAL MEDICINE

## 2024-10-02 PROCEDURE — 3008F BODY MASS INDEX DOCD: CPT | Performed by: INTERNAL MEDICINE

## 2024-10-02 PROCEDURE — 3078F DIAST BP <80 MM HG: CPT | Performed by: INTERNAL MEDICINE

## 2024-10-02 PROCEDURE — 90656 IIV3 VACC NO PRSV 0.5 ML IM: CPT | Performed by: INTERNAL MEDICINE

## 2024-10-02 PROCEDURE — 3074F SYST BP LT 130 MM HG: CPT | Performed by: INTERNAL MEDICINE

## 2024-10-02 NOTE — PROGRESS NOTES
Jaquelin De Leon is a 54 year old female.  Chief Complaint   Patient presents with    Checkup     Chronic Fatigue and Cough since July      HPI:   Jaquelin De Leon is a 54 year old female who presents for a chronic cough.    C/o cough since URI in July. Cough is non-productive, worse in the mornings or when talking/laughing a lot.  Denies dyspnea, f/c. Hx asthma but doesn't use albuterol inhaler, was never prescribed maintenance inhaler. Hasn't tried taking zyrtec/flonase. Hasn't tried taking antacids for GERD, states those sx are better.    Also c/o pleuritic pain R upper back. CXR, D-dimer negative. Denies GERD, abdominal pain, n/v/d.    Wt Readings from Last 6 Encounters:   10/02/24 177 lb (80.3 kg)   08/19/24 178 lb (80.7 kg)   12/13/23 179 lb 4 oz (81.3 kg)   11/08/23 170 lb (77.1 kg)   05/05/23 170 lb (77.1 kg)   01/14/22 159 lb 3.2 oz (72.2 kg)     Body mass index is 26.91 kg/m².     Current Outpatient Medications   Medication Sig Dispense Refill    Levalbuterol Tartrate (XOPENEX HFA) 45 MCG/ACT Inhalation Aerosol Inhale 2 puffs into the lungs every 4 (four) hours as needed for Wheezing or Shortness of Breath. 1 each 1      Past Medical History:    Anesthesia complication    BLOOD PRESSURE DROPPED    Anxiety    Asthma (HCC)    Back problem    COVID-19    outpatient    Cyst near tailbone    removed    Esophageal reflux    EGD 2012-Dr Dobson--esophagitis and min gastric erythema    Gestational diabetes (HCC)    Meningitis due to viruses (HCC)    Microscopic hematuria    CT urogram 12/24/15 negative on conclusion (8 mm cyst R kidney) to follow-up US per Dr. De La Rosa 1 year), cystoscopy and cytology negative.. renal US 10/2016-R renal simple cyst 9 mm.    Osteoarthritis    Prediabetes    hx gestational diabetes treated with insulin when pregnant    Sciatica of right side    Vertigo    MRI brain w & w/o contrast 12/16/15-results neg. Dr. Clemens referred to vestibular therapy, also in 2020    Visual impairment       Past Surgical History:   Procedure Laterality Date    Colonoscopy  06/2021    normal    Egd  03/2012    Lasik      Other  10/19/2018    IUD surgically removed    Other surgical history      5th digit cyst removed (L) foot 1995-96     Other surgical history      cyst removed from lower back      Family History   Problem Relation Age of Onset    Heart Disorder Mother         irregular heart beat    Heart Disorder Father     Lipids Father     Hypertension Father     Heart Attack Father 48    High Blood Pressure Sister     High Blood Pressure Brother     High Blood Pressure Brother     Breast Cancer Paternal Aunt         late 50's    Cancer Maternal Cousin Female         mid 40s pancreatic    Cancer Paternal Cousin Female     Other (2 brothers, 1 sister) Other       Social History:   Social History     Socioeconomic History    Marital status:    Tobacco Use    Smoking status: Never    Smokeless tobacco: Never   Vaping Use    Vaping status: Never Used   Substance and Sexual Activity    Alcohol use: Yes     Alcohol/week: 2.0 - 3.0 standard drinks of alcohol     Types: 2 - 3 Glasses of wine per week     Comment: weekly    Drug use: No   Other Topics Concern    Caffeine Concern Yes     Comment: Coffee     Social Determinants of Health     Food Insecurity: Low Risk  (9/25/2024)    Received from [facility]    Food Insecurity     Within the past 12 months, you worried that your food would run out before you got money to buy more.  : Never true     Within the past 12 months, the food you bought just didn't last and you didn't have money to get more. : Never true   Transportation Needs: Not At Risk (9/25/2024)    Received from [facility]    Transportation Needs     In the past 12 months, has lack of reliable transportation kept you from medical appointments, meetings, work or from getting things needed for daily living? : No          REVIEW OF SYSTEMS:   GENERAL: feels well otherwise, denies  f/c  HEENT: denies nasal congestion, sinus pain, sore throat  LUNGS: denies shortness of breath with exertion, + cough  CARDIOVASCULAR: denies chest pain, pressure, or palpitations  GI: denies GERD, abdominal pain, nausea, vomiting, diarrhea  NEURO: denies headaches, dizziness, focal deficits  EXT: denies edema    EXAM:   /62   Pulse 54   Ht 5' 8\" (1.727 m)   Wt 177 lb (80.3 kg)   SpO2 99%   BMI 26.91 kg/m²     GENERAL: well developed, well nourished, in no apparent distress  LUNGS: clear to auscultation b/l, no w/r/r  CARDIO: RRR, normal S1S2, no m/r/g  MSK: + ttp L thoracic paraspinals  GI: negative mccrary's sign, soft, NT, ND, no r/r/g  EXTREMITIES: no c/c/e, +2 radial and DP pulses bilaterally  NEURO: A&O x 3, moves all 4 extremities spontaneously      ASSESSMENT AND PLAN:   Jaquelin De Leon is a 54 year old female who presents for a chronic cough.    Cough  - possibly post-viral vs PND   - advised trial of antihistamine/flonase  - consider repeat PFTs/pulm referral if no improvement    Pleuritic pain  - advised anti-inflammatories w/food x1 week, heating pad to R upper back  - CT CHEST PE AORTA (IV ONLY) (CPT=71260); Future  - CBC W Differential W Platelet [E]; Future  - Comp Metabolic Panel (14) [E]; Future    RTC as previously scheduled or sooner PRN    For E/M code - 30 minutes spent reviewing performing chart review, obtaining a history, performing a physical exam, reviewing the assessment/plan, placing orders, and completing documentation.     Mckenzie Polo DO  10/2/2024  2:11 PM

## 2024-10-22 ENCOUNTER — HOSPITAL ENCOUNTER (OUTPATIENT)
Dept: CT IMAGING | Facility: HOSPITAL | Age: 54
End: 2024-10-22
Attending: INTERNAL MEDICINE
Payer: COMMERCIAL

## 2024-10-22 ENCOUNTER — TELEPHONE (OUTPATIENT)
Dept: INTERNAL MEDICINE CLINIC | Facility: CLINIC | Age: 54
End: 2024-10-22

## 2024-10-22 ENCOUNTER — TELEPHONE (OUTPATIENT)
Dept: CASE MANAGEMENT | Age: 54
End: 2024-10-22

## 2024-10-22 NOTE — TELEPHONE ENCOUNTER
Hello     We are reaching regarding the request for CT CHEST PE AORTA. This request is still pending the health plan. It is strongly encouraged that patient reschedule. I have sent a Recommendit message to patient.     Please advise    Thank you,  [place]  Referral specialist

## 2024-10-22 NOTE — TELEPHONE ENCOUNTER
Patient is scheduled to have the test done today 10/22/24 at 4PM.     The [employer] message that was sent to the patient this morning by [facility] has not been read.     I called the patient and notified her of the below information, since authorization is still pending patient strongly encouraged to reschedule her appointment.    To Dr.Stella GRANADO  [facility] can you please notify the patient as soon as there is a determination made by her health plan?

## 2024-10-22 NOTE — TELEPHONE ENCOUNTER
See other 10/22/24 telephone encounter.     please see below regarding expediting. Patient rescheduled the test for 10/29/24 at 4pm.   The referral specialist is already working on this.

## 2024-10-22 NOTE — TELEPHONE ENCOUNTER
Patient called to let  know she did reschedule  her CT for next week. However she talked to [facility] and they told her it could take up 5-15 days to get approval. [facility] also told her if the Doctor calls it could exopodite the process.      [facility]# [phone]

## 2024-10-24 ENCOUNTER — TELEPHONE (OUTPATIENT)
Dept: CASE MANAGEMENT | Age: 54
End: 2024-10-24

## 2024-10-24 ENCOUNTER — PATIENT MESSAGE (OUTPATIENT)
Dept: CASE MANAGEMENT | Age: 54
End: 2024-10-24

## 2024-10-24 DIAGNOSIS — R05.3 CHRONIC COUGH: Primary | ICD-10-CM

## 2024-10-24 NOTE — TELEPHONE ENCOUNTER
[facility] Chest        Status: DENIED        Reference number [id number]     A copy of the denial letter is filed under the MEDIA tab, reference for complete details. You may reach out to [facility] at [phone] to discuss decision.     Please reach out to patient with plan of care. Patients appointment 10/29/2024     Thank you      Denial   They are looking for PFTs or Spirometry or Peer to peer

## 2024-10-25 NOTE — TELEPHONE ENCOUNTER
Spoke to pt and advised on MD message below; pt verbalized understanding.      To [facility] team-    Can you please assist with auth for new order?

## 2024-10-25 NOTE — TELEPHONE ENCOUNTER
A peer to peer will still be required. [employer] BS. If they deny the CPT 86975 They will still deny the 33895 They are considered grouper codes with the health plan. You still need to do the Peer to Peer    [facility] [phone]  Case # [id number]

## 2024-10-25 NOTE — TELEPHONE ENCOUNTER
Please inform pt CTPE was denied by insurance. Given lower suspicion for clot as negative d-dimer will order plain CT chest, order placed. Thank you!

## 2024-11-29 ENCOUNTER — HOSPITAL ENCOUNTER (OUTPATIENT)
Dept: MAMMOGRAPHY | Facility: HOSPITAL | Age: 54
Discharge: HOME | End: 2024-11-29
Attending: OBSTETRICS & GYNECOLOGY
Payer: COMMERCIAL

## 2024-11-29 DIAGNOSIS — Z12.31 ENCOUNTER FOR SCREENING MAMMOGRAM FOR MALIGNANT NEOPLASM OF BREAST: ICD-10-CM

## 2024-11-29 PROCEDURE — 77067 SCR MAMMO BI INCL CAD: CPT | Performed by: OBSTETRICS & GYNECOLOGY

## 2024-11-29 PROCEDURE — 77063 BREAST TOMOSYNTHESIS BI: CPT | Performed by: OBSTETRICS & GYNECOLOGY

## (undated) DEVICE — SOL  .9 3000ML

## (undated) DEVICE — STERILE LATEX POWDER-FREE SURGICAL GLOVESWITH NITRILE COATING: Brand: PROTEXIS

## (undated) DEVICE — HYSTEROSCOPY: Brand: MEDLINE INDUSTRIES, INC.

## (undated) DEVICE — WOLF INFLOW HYSTER

## (undated) DEVICE — 1016 S-DRAPE IRRIG POUCH 10/BOX: Brand: STERI-DRAPE™

## (undated) DEVICE — MEDI-VAC NON-CONDUCTIVE SUCTION TUBING: Brand: CARDINAL HEALTH

## (undated) NOTE — LETTER
17      Patient: Rebecca Mccray  : 1970 Visit date: 8/3/2017    Dear Chase Contreras,      I examined your patient in consultation today. 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   He

## (undated) NOTE — LETTER
Date & Time: 12/7/2022, 2:33 PM  Patient: Claudio Degree  Encounter Provider(s):    KATHLEEN Saucedo       To Whom It May Concern:    Gabe Chang was seen and treated in our department on 12/7/2022. She should not return to work until 12/12/2022. If you have any questions or concerns, please do not hesitate to call.     CHINEDU Gordon    _____________________________  [id number]

## (undated) NOTE — LETTER
17      Patient: Pamela Fuller  : 1970 Visit date: 2017    Dear Claribel Edouard,      I examined your patient in follow-up today. She has been under treatment for bilateral thumb synovitis.   Her right hand is minimally sympto

## (undated) NOTE — LETTER
10/25/2018          To Whom It May Concern:    Elsa Hankins is currently under my medical care and may not return to work at this time. Please excuse [employer] for 2 days. She may return to work on 10/29/18.   If you require additional information ple